# Patient Record
Sex: FEMALE | Race: WHITE | NOT HISPANIC OR LATINO | Employment: FULL TIME | URBAN - METROPOLITAN AREA
[De-identification: names, ages, dates, MRNs, and addresses within clinical notes are randomized per-mention and may not be internally consistent; named-entity substitution may affect disease eponyms.]

---

## 2017-01-19 ENCOUNTER — ALLIED HEALTH/NURSE VISIT (OUTPATIENT)
Dept: FAMILY MEDICINE | Facility: CLINIC | Age: 42
End: 2017-01-19
Payer: OTHER GOVERNMENT

## 2017-01-19 DIAGNOSIS — R07.0 THROAT PAIN: Primary | ICD-10-CM

## 2017-01-19 LAB
DEPRECATED S PYO AG THROAT QL EIA: NORMAL
MICRO REPORT STATUS: NORMAL
SPECIMEN SOURCE: NORMAL

## 2017-01-19 PROCEDURE — 99207 ZZC NO CHARGE NURSE ONLY: CPT

## 2017-01-19 PROCEDURE — 87081 CULTURE SCREEN ONLY: CPT | Performed by: FAMILY MEDICINE

## 2017-01-19 PROCEDURE — 87880 STREP A ASSAY W/OPTIC: CPT | Performed by: FAMILY MEDICINE

## 2017-01-19 NOTE — Clinical Note
[facility]  [address] [facility] [address]  Phone: [phone]    January 23, 2017    Jane Andrew  [address] [address] [address]              Dear Ms. Andrew,      The results of your 24 hour throat culture were negative. Please contact your clinic if you have any questions or concerns.              Sincerely,      Sameera Avila MD/ [facility]

## 2017-01-19 NOTE — NURSING NOTE
Patient here today for strep test only. Daughter was tested positive in clinic.     Rapid strep was negative and patient notified.    Kay Worrell CMA

## 2017-01-21 LAB
BACTERIA SPEC CULT: NORMAL
MICRO REPORT STATUS: NORMAL
SPECIMEN SOURCE: NORMAL

## 2017-05-23 ENCOUNTER — OFFICE VISIT (OUTPATIENT)
Dept: DERMATOLOGY | Facility: CLINIC | Age: 42
End: 2017-05-23
Payer: COMMERCIAL

## 2017-05-23 VITALS — OXYGEN SATURATION: 99 % | DIASTOLIC BLOOD PRESSURE: 70 MMHG | HEART RATE: 79 BPM | SYSTOLIC BLOOD PRESSURE: 128 MMHG

## 2017-05-23 DIAGNOSIS — L81.4 LENTIGO: ICD-10-CM

## 2017-05-23 DIAGNOSIS — D23.9 DERMAL NEVUS: ICD-10-CM

## 2017-05-23 DIAGNOSIS — D48.5 NEOPLASM OF UNCERTAIN BEHAVIOR OF SKIN: Primary | ICD-10-CM

## 2017-05-23 DIAGNOSIS — D18.00 ANGIOMA: ICD-10-CM

## 2017-05-23 DIAGNOSIS — D22.9 NEVUS: ICD-10-CM

## 2017-05-23 PROCEDURE — 88305 TISSUE EXAM BY PATHOLOGIST: CPT | Performed by: DERMATOLOGY

## 2017-05-23 PROCEDURE — 99203 OFFICE O/P NEW LOW 30 MIN: CPT | Mod: 25 | Performed by: DERMATOLOGY

## 2017-05-23 PROCEDURE — 11101 HC BIOPSY SKIN/SUBQ/MUC MEM, EACH ADDTL LESION: CPT | Performed by: DERMATOLOGY

## 2017-05-23 PROCEDURE — 11100 HC BIOPSY SKIN/SUBQ/MUC MEM, SINGLE LESION: CPT | Performed by: DERMATOLOGY

## 2017-05-23 NOTE — PROGRESS NOTES
Jane Andrew is a 41 year old year old female patient here today for itching spots on neck and growing spot on forehead.   .  Patient states this has been present for a while.  Patient reports the following symptoms:  itching.  Patient reports the following previous treatments none.  Patient reports the following modifying factors none.  Associated symptoms: none.  Patient has no other skin complaints today.  Remainder of the HPI, Meds, PMH, Allergies, FH, and SH was reviewed in chart.    Pertinent Hx:   No hx of skin cancer  Past Medical History:   Diagnosis Date     Chickenpox      NO ACTIVE PROBLEMS        Past Surgical History:   Procedure Laterality Date     HC TOOTH EXTRACTION W/FORCEP      wisdom teeth         Family History   Problem Relation Age of Onset     Hypertension Mother      HEART DISEASE Father      CHF     Hypertension Father      CANCER Maternal Grandfather      HEART DISEASE Maternal Grandfather      Genitourinary Problems Paternal Grandfather      Genetic Disorder Maternal Aunt      fathers twin had spina bifida       Social History     Social History     Marital status:      Spouse name: N/A     Number of children: N/A     Years of education: N/A     Occupational History     Not on file.     Social History Main Topics     Smoking status: Never Smoker     Smokeless tobacco: Never Used     Alcohol use No     Drug use: No     Sexual activity: Yes     Partners: Male     Other Topics Concern     Parent/Sibling W/ Cabg, Mi Or Angioplasty Before 65f 55m? No     Social History Narrative       Outpatient Encounter Prescriptions as of 5/23/2017   Medication Sig Dispense Refill     medroxyPROGESTERone (DEPO-PROVERA) 150 MG/ML injection Inject 1 mL into the muscle every 3 months. 1 mL 0     No facility-administered encounter medications on file as of 5/23/2017.              Review Of Systems  Skin: As above  Eyes: negative  Ears/Nose/Throat: negative  Respiratory: No shortness of breath, dyspnea  on exertion, cough, or hemoptysis  Cardiovascular: negative  Gastrointestinal: negative  Genitourinary: negative  Musculoskeletal: negative  Neurologic: negative  Psychiatric: negative  Hematologic/Lymphatic/Immunologic: negative  Endocrine: negative      O:   NAD, WDWN, Alert & Oriented, Mood & Affect wnl, Vitals stable   Here today alone   /70  Pulse 79  SpO2 99%   General appearance normal   Vitals stable   Alert, oriented and in no acute distress      Following lymph nodes palpated: Occipital, Cervical, Supraclavicular no lad   Brown macules on trunk   2-3mm pigmented macules with regular borders and pigment networks on buttocks and legs and feet   Red papule son trunk   L post neck superior 7mm papillamtous papule    L post neck inferior 15mm papillamtous papule   R forehead 1.1cm flesh colore dpapule      The remainder of the full exam was unremarkable; the following areas were examined:  conjunctiva/lids, oral mucosa, neck, peripheral vascular system, abdomen, lymph nodes, digits/nails, eccrine and apocrine glands, scalp/hair, face, neck, chest, abdomen, buttocks, back, RUE, LUE, RLE, LLE       Eyes: Conjunctivae/lids:Normal     ENT: Lips, buccal mucosa, tongue: normal    MSK:Normal    Cardiovascular: peripheral edema none    Pulm: Breathing Normal    Lymph Nodes: No Head and Neck Lymphadenopathy     Neuro/Psych: Orientation:Normal; Mood/Affect:Normal      A/P:  1., angioma, lentigo, nevi   2. L post neck sup and inf r/o irritated idn  TANGENTIAL BIOPSY SENT OUT:  After consent, anesthesia with LEC and prep, tangential excision performed and specimen sent out for permanent section histology.  No complications and routine wound care. Patient told to call our office in 1-2 weeks for result.      3. Growing nevus  Schedule excision   BENIGN LESIONS DISCUSSED WITH PATIENT:  I discussed the specifics of tumor, prognosis, and genetics of benign lesions.  I explained that treatment of these lesions would be  purely cosmetic and not medically neccessary.  I discussed with patient different removal options including excision, cautery and /or laser.      Nature and genetics of benign skin lesions dicussed with patient.  Signs and Symptoms of skin cancer discussed with patient.  ABCDEs of melanoma reviewed with patient.  Patient encouraged to perform monthly skin exams.  UV precautions reviewed with patient.  Skin care regimen reviewed with patient: Eliminate harsh soaps, i.e. Dial, zest, irsih spring; Mild soaps such as Cetaphil or Dove sensitive skin, avoid hot or cold showers, aggressive use of emollients including vanicream, cetaphil or cerave discussed with patient.    Risks of non-melanoma skin cancer discussed with patient   Return to clinic 12 months

## 2017-05-23 NOTE — MR AVS SNAPSHOT
After Visit Summary   5/23/2017    Jane Andrew    MRN: [id number]           Patient Information     Date Of Birth          1975        Visit Information        Provider Department      5/23/2017 12:00 PM Bruce Gillespie MD [facility]        Today's Diagnoses     Neoplasm of uncertain behavior of skin    -  1    Dermal nevus        Nevus        Lentigo        Angioma          Care Instructions          Wound Care Instructions     FOR SUPERFICIAL WOUNDS     [facility] [phone]    [facility] [phone]                       AFTER 24 HOURS YOU SHOULD REMOVE THE BANDAGE AND BEGIN DAILY DRESSING CHANGES AS FOLLOWS:     1) Remove Dressing.     2) Clean and dry the area with tap water using a Q-tip or sterile gauze pad.     3) Apply Vaseline, Aquaphor, Polysporin ointment or Bacitracin ointment over entire wound.  Do NOT use Neosporin ointment.     4) Cover the wound with a band-aid, or a sterile non-stick gauze pad and micropore paper tape      REPEAT THESE INSTRUCTIONS AT LEAST ONCE A DAY UNTIL THE WOUND HAS COMPLETELY HEALED.    It is an old wives tale that a wound heals better when it is exposed to air and allowed to dry out. The wound will heal faster with a better cosmetic result if it is kept moist with ointment and covered with a bandage.    **Do not let the wound dry out.**      Supplies Needed:      *Cotton tipped applicators (Q-tips)    *Polysporin Ointment or Bacitracin Ointment (NOT NEOSPORIN)    *Band-aids or non-stick gauze pads and micropore paper tape.      PATIENT INFORMATION:    During the healing process you will notice a number of changes. All wounds develop a small halo of redness surrounding the wound.  This means healing is occurring. Severe itching with extensive redness usually indicates sensitivity to the ointment or bandage tape used to dress the wound.  You should call our office if this develops.      Swelling  and/or  discoloration around your surgical site is common, particularly when performed around the eye.    All wounds normally drain.  The larger the wound the more drainage there will be.  After 7-10 days, you will notice the wound beginning to shrink and new skin will begin to grow.  The wound is healed when you can see skin has formed over the entire area.  A healed wound has a healthy, shiny look to the surface and is red to dark pink in color to normalize.  Wounds may take approximately 4-6 weeks to heal.  Larger wounds may take 6-8 weeks.  After the wound is healed you may discontinue dressing changes.    You may experience a sensation of tightness as your wound heals. This is normal and will gradually subside.    Your healed wound may be sensitive to temperature changes. This sensitivity improves with time, but if you re having a lot of discomfort, try to avoid temperature extremes.    Patients frequently experience itching after their wound appears to have healed because of the continue healing under the skin.  Plain Vaseline will help relieve the itching.        POSSIBLE COMPLICATIONS    BLEEDIN. Leave the bandage in place.  2. Use tightly rolled up gauze or a cloth to apply direct pressure over the bandage for 30  minutes.  3. Reapply pressure for an additional 30 minutes if necessary  4. Use additional gauze and tape to maintain pressure once the bleeding has stopped.          Follow-ups after your visit        Who to contact     If you have questions or need follow up information about today's clinic visit or your schedule please contact [facility] directly at [phone].  Normal or non-critical lab and imaging results will be communicated to you by MyChart, letter or phone within 4 business days after the clinic has received the results. If you do not hear from us within 7 days, please contact the clinic through MyChart or phone. If you have a critical or abnormal lab result, we will  notify you by phone as soon as possible.  Submit refill requests through [employer] or call your pharmacy and they will forward the refill request to us. Please allow 3 business days for your refill to be completed.          Additional Information About Your Visit        Covercake[employer]Comeet Information     [employer] gives you secure access to your electronic health record. If you see a primary care provider, you can also send messages to your care team and make appointments. If you have questions, please call your primary care clinic.  If you do not have a primary care provider, please call [phone] and they will assist you.        Care EveryWhere ID     This is your Care EveryWhere ID. This could be used by other organizations to access your [place] medical records  [id number]        Your Vitals Were     Pulse Pulse Oximetry                79 99%           Blood Pressure from Last 3 Encounters:   05/23/17 128/70   09/10/15 125/71   08/15/13 110/70    Weight from Last 3 Encounters:   09/10/15 72.6 kg (160 lb)   08/15/13 75.3 kg (166 lb)   02/20/13 75.6 kg (166 lb 9.6 oz)              We Performed the Following     BIOPSY SKIN/SUBQ/MUC MEM, EACH ADDTL LESION     BIOPSY SKIN/SUBQ/MUC MEM, SINGLE LESION     Surgical pathology exam        Primary Care Provider Office Phone # Fax #    Bernice Anglin [phone]       [facility] [address] [facility] [address]        Thank you!     Thank you for choosing [facility]  for your care. Our goal is always to provide you with excellent care. Hearing back from our patients is one way we can continue to improve our services. Please take a few minutes to complete the written survey that you may receive in the mail after your visit with us. Thank you!             Your Updated Medication List - Protect others around you: Learn how to safely use, store and throw away your medicines at www.disposemymeds.org.          This list is accurate as of:  5/23/17 12:13 PM.  Always use your most recent med list.                   Brand Name Dispense Instructions for use    DEPO-PROVERA 150 MG/ML injection   Generic drug:  medroxyPROGESTERone     1 mL    Inject 1 mL into the muscle every 3 months.

## 2017-05-23 NOTE — NURSING NOTE
"Initial /70  Pulse 79  SpO2 99% Estimated body mass index is 28.34 kg/(m^2) as calculated from the following:    Height as of 9/10/15: 1.6 m (5' 3\").    Weight as of 9/10/15: 72.6 kg (160 lb). .      "

## 2017-05-23 NOTE — PATIENT INSTRUCTIONS
Wound Care Instructions     FOR SUPERFICIAL WOUNDS     [facility] [phone]    [facility] [phone]                       AFTER 24 HOURS YOU SHOULD REMOVE THE BANDAGE AND BEGIN DAILY DRESSING CHANGES AS FOLLOWS:     1) Remove Dressing.     2) Clean and dry the area with tap water using a Q-tip or sterile gauze pad.     3) Apply Vaseline, Aquaphor, Polysporin ointment or Bacitracin ointment over entire wound.  Do NOT use Neosporin ointment.     4) Cover the wound with a band-aid, or a sterile non-stick gauze pad and micropore paper tape      REPEAT THESE INSTRUCTIONS AT LEAST ONCE A DAY UNTIL THE WOUND HAS COMPLETELY HEALED.    It is an old wives tale that a wound heals better when it is exposed to air and allowed to dry out. The wound will heal faster with a better cosmetic result if it is kept moist with ointment and covered with a bandage.    **Do not let the wound dry out.**      Supplies Needed:      *Cotton tipped applicators (Q-tips)    *Polysporin Ointment or Bacitracin Ointment (NOT NEOSPORIN)    *Band-aids or non-stick gauze pads and micropore paper tape.      PATIENT INFORMATION:    During the healing process you will notice a number of changes. All wounds develop a small halo of redness surrounding the wound.  This means healing is occurring. Severe itching with extensive redness usually indicates sensitivity to the ointment or bandage tape used to dress the wound.  You should call our office if this develops.      Swelling  and/or discoloration around your surgical site is common, particularly when performed around the eye.    All wounds normally drain.  The larger the wound the more drainage there will be.  After 7-10 days, you will notice the wound beginning to shrink and new skin will begin to grow.  The wound is healed when you can see skin has formed over the entire area.  A healed wound has a healthy, shiny look to the surface and is red to dark pink in color  to normalize.  Wounds may take approximately 4-6 weeks to heal.  Larger wounds may take 6-8 weeks.  After the wound is healed you may discontinue dressing changes.    You may experience a sensation of tightness as your wound heals. This is normal and will gradually subside.    Your healed wound may be sensitive to temperature changes. This sensitivity improves with time, but if you re having a lot of discomfort, try to avoid temperature extremes.    Patients frequently experience itching after their wound appears to have healed because of the continue healing under the skin.  Plain Vaseline will help relieve the itching.        POSSIBLE COMPLICATIONS    BLEEDIN. Leave the bandage in place.  2. Use tightly rolled up gauze or a cloth to apply direct pressure over the bandage for 30  minutes.  3. Reapply pressure for an additional 30 minutes if necessary  4. Use additional gauze and tape to maintain pressure once the bleeding has stopped.

## 2017-05-30 LAB — COPATH REPORT: NORMAL

## 2017-06-06 ENCOUNTER — OFFICE VISIT (OUTPATIENT)
Dept: DERMATOLOGY | Facility: CLINIC | Age: 42
End: 2017-06-06
Payer: COMMERCIAL

## 2017-06-06 VITALS — DIASTOLIC BLOOD PRESSURE: 72 MMHG | OXYGEN SATURATION: 95 % | HEART RATE: 65 BPM | SYSTOLIC BLOOD PRESSURE: 124 MMHG

## 2017-06-06 DIAGNOSIS — D48.5 NEOPLASM OF UNCERTAIN BEHAVIOR OF SKIN: Primary | ICD-10-CM

## 2017-06-06 PROCEDURE — 88305 TISSUE EXAM BY PATHOLOGIST: CPT | Performed by: DERMATOLOGY

## 2017-06-06 PROCEDURE — 13132 CMPLX RPR F/C/C/M/N/AX/G/H/F: CPT | Performed by: DERMATOLOGY

## 2017-06-06 PROCEDURE — 11442 EXC FACE-MM B9+MARG 1.1-2 CM: CPT | Performed by: DERMATOLOGY

## 2017-06-06 NOTE — PROGRESS NOTES
Jane Andrew is a 41 year old year old female patient here today for evaluation and managment of growing itching mole on right forehead.  Patient has no other skin complaints today.  Remainder of the HPI, Meds, PMH, Allergies, FH, and SH was reviewed in chart.      Past Medical History:   Diagnosis Date     Chickenpox      NO ACTIVE PROBLEMS        Past Surgical History:   Procedure Laterality Date     HC TOOTH EXTRACTION W/FORCEP      wisdom teeth         Family History   Problem Relation Age of Onset     Hypertension Mother      HEART DISEASE Father      CHF     Hypertension Father      CANCER Maternal Grandfather      HEART DISEASE Maternal Grandfather      Genitourinary Problems Paternal Grandfather      Genetic Disorder Maternal Aunt      fathers twin had spina bifida       Social History     Social History     Marital status:      Spouse name: N/A     Number of children: N/A     Years of education: N/A     Occupational History     Not on file.     Social History Main Topics     Smoking status: Never Smoker     Smokeless tobacco: Never Used     Alcohol use No     Drug use: No     Sexual activity: Yes     Partners: Male     Other Topics Concern     Parent/Sibling W/ Cabg, Mi Or Angioplasty Before 65f 55m? No     Social History Narrative       Outpatient Encounter Prescriptions as of 6/6/2017   Medication Sig Dispense Refill     Multiple Vitamins-Minerals (MULTIVITAMIN ADULT PO)        [DISCONTINUED] medroxyPROGESTERone (DEPO-PROVERA) 150 MG/ML injection Inject 1 mL into the muscle every 3 months. 1 mL 0     No facility-administered encounter medications on file as of 6/6/2017.              Review Of Systems  Skin: As above  Eyes: negative  Ears/Nose/Throat: negative  Respiratory: No shortness of breath, dyspnea on exertion, cough, or hemoptysis  Cardiovascular: negative  Gastrointestinal: negative  Genitourinary: negative  Musculoskeletal: negative  Neurologic: negative  Psychiatric:  negative  Hematologic/Lymphatic/Immunologic: negative  Endocrine: negative      O:   NAD, WDWN, Alert & Oriented, Mood & Affect wnl, Vitals stable   Here today alone   /72  Pulse 65  SpO2 95%   General appearance normal   Vitals stable   Alert, oriented and in no acute distress     R forehead flesh colored 1.2cm plaque       Eyes: Conjunctivae/lids:Normal     ENT: Lips, buccal mucosa, tongue: normal    MSK:Normal    Cardiovascular: peripheral edema none    Pulm: Breathing Normal    Neuro/Psych: Orientation:Normal; Mood/Affect:Normal      A/P:  1. Growing dermal nevus  EXCISION OF BENIGN LESION AND COMPLEX: [facility] obtained, anesthesia and prep, the margins of the lesion were identified and an elliptical incision was made encompassing the lesion. The incisions were made through the skin and down to and including the subcutaneous tissue. The lesion was removed en bloc and submitted for perms pathologic review. The wound edges were widely undermined until adequate tissue mobility was obtained. hemostasis was achieved. The wound edges were then closed in a layered fashion, being careful not to leave any dead space. Postoperative length was 3.2 cm.   EBL minimal; complications none; wound care routine. The patient was discharged in good condition and will return in one week for wound evaluation.

## 2017-06-06 NOTE — MR AVS SNAPSHOT
After Visit Summary   2017    Jane Andrew    MRN: [id number]           Patient Information     Date Of Birth          1975        Visit Information        Provider Department      2017 8:15 AM Bruce Gillespie MD [facility]        Care Instructions      Sutured Wound Care     [facility]: [phone]    [facility]: [phone]    Right forehead      ? No strenuous activity for 48 hours. Resume moderate activity in 48 hours. No heavy exercising until you are seen for follow up in one week.     ? Take Tylenol as needed for discomfort.                         ? Do not drink alcoholic beverages for 48 hours.     ? Keep the pressure bandage in place for 24 hours. If the bandage becomes blood tinged or loose, reinforce it with gauze and tape.        (Refer to the reverse side of this page for management of bleeding).    ? Remove pressure bandage in 24 hours     ? Leave the flat bandage in place until your follow up appointment.    ? Keep the bandage dry. Wash around it carefully.    ? If the tape becomes soiled or starts to come off, reinforce it with additional paper tape.    ? Do not smoke for 3 weeks; smoking is detrimental to wound healing.    ? It is normal to have swelling and bruising around the surgical site. The bruising will fade in approximately 10-14 days. Elevate the area to reduce swelling.    ? Numbness, itchiness and sensitivity to temperature changes can occur after surgery and may take up to 18 months to normalize.      POSSIBLE COMPLICATIONS    BLEEDIN. Leave the bandage in place.  2. Use tightly rolled up gauze or a cloth to apply direct pressure over the bandage for 20   minutes.  3. Reapply pressure for an additional 20 minutes if necessary  4. Call the office or go to the nearest emergency room if pressure fails to stop the bleeding.  5. Use additional gauze and tape to maintain pressure once the bleeding has  stopped.        PAIN:    1. Post operative pain should slowly get better, never worse.  2. A severe increase in pain may indicate a problem. Call the office if this occurs.    In case of emergency phone:Dr Gillespie [phone]            Follow-ups after your visit        Who to contact     If you have questions or need follow up information about today's clinic visit or your schedule please contact [facility] directly at [phone].  Normal or non-critical lab and imaging results will be communicated to you by CallApphart, letter or phone within 4 business days after the clinic has received the results. If you do not hear from us within 7 days, please contact the clinic through [employer] or phone. If you have a critical or abnormal lab result, we will notify you by phone as soon as possible.  Submit refill requests through [employer] or call your pharmacy and they will forward the refill request to us. Please allow 3 business days for your refill to be completed.          Additional Information About Your Visit        Store[employer] Information     [employer] gives you secure access to your electronic health record. If you see a primary care provider, you can also send messages to your care team and make appointments. If you have questions, please call your primary care clinic.  If you do not have a primary care provider, please call [phone] and they will assist you.        Care EveryWhere ID     This is your Care EveryWhere ID. This could be used by other organizations to access your [place] medical records  [id number]        Your Vitals Were     Pulse Pulse Oximetry                65 95%           Blood Pressure from Last 3 Encounters:   06/06/17 124/72   05/23/17 128/70   09/10/15 125/71    Weight from Last 3 Encounters:   09/10/15 72.6 kg (160 lb)   08/15/13 75.3 kg (166 lb)   02/20/13 75.6 kg (166 lb 9.6 oz)              Today, you had the following     No orders found for display         Today's  Medication Changes          These changes are accurate as of: 6/6/17  8:51 AM.  If you have any questions, ask your nurse or doctor.               Stop taking these medicines if you haven't already. Please contact your care team if you have questions.     DEPO-PROVERA 150 MG/ML injection   Generic drug:  medroxyPROGESTERone   Stopped by:  Bruce Gillespie MD                    Primary Care Provider Office Phone # Fax #    Bernice Anglin [phone]       [facility] [address] [facility] [address]        Thank you!     Thank you for choosing [facility]  for your care. Our goal is always to provide you with excellent care. Hearing back from our patients is one way we can continue to improve our services. Please take a few minutes to complete the written survey that you may receive in the mail after your visit with us. Thank you!             Your Updated Medication List - Protect others around you: Learn how to safely use, store and throw away your medicines at www.disposemymeds.org.          This list is accurate as of: 6/6/17  8:51 AM.  Always use your most recent med list.                   Brand Name Dispense Instructions for use    MULTIVITAMIN ADULT PO

## 2017-06-06 NOTE — NURSING NOTE
"Initial /72  Pulse 65  SpO2 95% Estimated body mass index is 28.34 kg/(m^2) as calculated from the following:    Height as of 9/10/15: 1.6 m (5' 3\").    Weight as of 9/10/15: 72.6 kg (160 lb). .    Kasandra Perez LPN    "

## 2017-06-06 NOTE — PATIENT INSTRUCTIONS
Sutured Wound Care     [facility]: [phone]    [facility]: [phone]    Right forehead      ? No strenuous activity for 48 hours. Resume moderate activity in 48 hours. No heavy exercising until you are seen for follow up in one week.     ? Take Tylenol as needed for discomfort.                         ? Do not drink alcoholic beverages for 48 hours.     ? Keep the pressure bandage in place for 24 hours. If the bandage becomes blood tinged or loose, reinforce it with gauze and tape.        (Refer to the reverse side of this page for management of bleeding).    ? Remove pressure bandage in 24 hours     ? Leave the flat bandage in place until your follow up appointment.    ? Keep the bandage dry. Wash around it carefully.    ? If the tape becomes soiled or starts to come off, reinforce it with additional paper tape.    ? Do not smoke for 3 weeks; smoking is detrimental to wound healing.    ? It is normal to have swelling and bruising around the surgical site. The bruising will fade in approximately 10-14 days. Elevate the area to reduce swelling.    ? Numbness, itchiness and sensitivity to temperature changes can occur after surgery and may take up to 18 months to normalize.      POSSIBLE COMPLICATIONS    BLEEDIN. Leave the bandage in place.  2. Use tightly rolled up gauze or a cloth to apply direct pressure over the bandage for 20   minutes.  3. Reapply pressure for an additional 20 minutes if necessary  4. Call the office or go to the nearest emergency room if pressure fails to stop the bleeding.  5. Use additional gauze and tape to maintain pressure once the bleeding has stopped.        PAIN:    1. Post operative pain should slowly get better, never worse.  2. A severe increase in pain may indicate a problem. Call the office if this occurs.    In case of emergency phone:Dr Gillespie [phone]

## 2017-06-09 LAB — COPATH REPORT: NORMAL

## 2017-06-14 ENCOUNTER — ALLIED HEALTH/NURSE VISIT (OUTPATIENT)
Dept: DERMATOLOGY | Facility: CLINIC | Age: 42
End: 2017-06-14
Payer: COMMERCIAL

## 2017-06-14 DIAGNOSIS — Z48.01 ENCOUNTER FOR CHANGE OR REMOVAL OF SURGICAL WOUND DRESSING: Primary | ICD-10-CM

## 2017-06-14 PROCEDURE — 99207 ZZC NO CHARGE NURSE ONLY: CPT

## 2017-06-14 NOTE — PATIENT INSTRUCTIONS
WOUND CARE INSTRUCTIONS  for  ONE WEEK AFTER SURGERY               Right Forehead       1) Leave flat bandage on your skin for one week after today s bandage change.  2) In one week when you remove the bandage, you may resume your regular skin care routine, including washing with mild soap and water, applying moisturizer, make-up and sunscreen.    3) If there are any open or bleeding areas at the incision/graft site you should begin to cover the area with a bandage daily as follows:    1) Clean and dry the area with plain tap water using a Q-tip or sterile gauze pad.  2) Apply Polysporin or Bacitracin ointment to the open area.  3) Cover the wound with a band-aid or a sterile non-stick gauze pad and micropore paper tape.             *Once the bandages are removed, the scar will be red and firm (especially in the lip/chin area). This is normal and will fade in time. It might take 6-12 months for this to happen.     *Massaging the area will help the scar soften and fade quicker. Begin to massage the area one month after the bandages have been removed. To massage apply pressure directly and firmly over the scar with the fingertips and move in a circular motion. Massage the area for a few minutes several times a day. Continue to massage the site for several months.    *Approximately 6-8 weeks after surgery it is not uncommon to see the formation of  tender pimple-like  bump along the scar. This is normal. As the scar continues to mature and the stitches underneath the skin begin to dissolve, this might occur. Do not pick or squeeze, this will resolve on it s own. Should one break open producing a small amount of drainage, apply Polysporin or Bacitracin ointment a few times a day until the wound is completely healed.    *Numbness in the surgical area is expected. It might take 12-18 months for the feeling to return to normal. During this time sensations of itchiness, tingling and occasional sharp pains might be noted.  These feelings are normal and will subside once the nerves have completely healed.         IN CASE OF EMERGENCY: Dr Gillespie [phone]     If you were seen in [place] call: [phone]    If you were seen in [place] call: [phone]

## 2017-06-14 NOTE — MR AVS SNAPSHOT
After Visit Summary   6/14/2017    Jane Andrew    MRN: [id number]           Patient Information     Date Of Birth          1975        Visit Information        Provider Department      6[facility]14/2017 1[facility]00 [facility]        Care Instructions    WOUND CARE INSTRUCTIONS  for  ONE WEEK AFTER SURGERY               Right Forehead       1) Leave flat bandage on your skin for one week after today s bandage change.  2) In one week when you remove the bandage, you may resume your regular skin care routine, including washing with mild soap and water, applying moisturizer, make-up and sunscreen.    3) If there are any open or bleeding areas at the incision/graft site you should begin to cover the area with a bandage daily as follows:    1) Clean and dry the area with plain tap water using a Q-tip or sterile gauze pad.  2) Apply Polysporin or Bacitracin ointment to the open area.  3) Cover the wound with a band-aid or a sterile non-stick gauze pad and micropore paper tape.             *Once the bandages are removed, the scar will be red and firm (especially in the lip/chin area). This is normal and will fade in time. It might take 6-12 months for this to happen.     *Massaging the area will help the scar soften and fade quicker. Begin to massage the area one month after the bandages have been removed. To massage apply pressure directly and firmly over the scar with the fingertips and move in a circular motion. Massage the area for a few minutes several times a day. Continue to massage the site for several months.    *Approximately 6-8 weeks after surgery it is not uncommon to see the formation of  tender pimple-like  bump along the scar. This is normal. As the scar continues to mature and the stitches underneath the skin begin to dissolve, this might occur. Do not pick or squeeze, this will resolve on it s own. Should one break open producing a small amount of drainage, apply  Polysporin or Bacitracin ointment a few times a day until the wound is completely healed.    *Numbness in the surgical area is expected. It might take 12-18 months for the feeling to return to normal. During this time sensations of itchiness, tingling and occasional sharp pains might be noted. These feelings are normal and will subside once the nerves have completely healed.         IN CASE OF EMERGENCY: Dr Gillespie [phone]     If you were seen in [place] call: [phone]    If you were seen in [place] call: [phone]              Follow-ups after your visit        Who to contact     If you have questions or need follow up information about today's clinic visit or your schedule please contact [facility] directly at [phone].  Normal or non-critical lab and imaging results will be communicated to you by MyChart, letter or phone within 4 business days after the clinic has received the results. If you do not hear from us within 7 days, please contact the clinic through [employer] or phone. If you have a critical or abnormal lab result, we will notify you by phone as soon as possible.  Submit refill requests through [employer] or call your pharmacy and they will forward the refill request to us. Please allow 3 business days for your refill to be completed.          Additional Information About Your Visit        [employer] Information     [employer] gives you secure access to your electronic health record. If you see a primary care provider, you can also send messages to your care team and make appointments. If you have questions, please call your primary care clinic.  If you do not have a primary care provider, please call [phone] and they will assist you.        Care EveryWhere ID     This is your Care EveryWhere ID. This could be used by other organizations to access your [place] medical records  [id number]         Blood Pressure from Last 3 Encounters:   06/06/17 124/72   05/23/17 128/70    09/10/15 125/71    Weight from Last 3 Encounters:   09/10/15 72.6 kg (160 lb)   08/15/13 75.3 kg (166 lb)   02/20/13 75.6 kg (166 lb 9.6 oz)              Today, you had the following     No orders found for display       Primary Care Provider Office Phone # Fax #    Bernice Anglin [phone]       [facility] [address] [facility] [address]        Thank you!     Thank you for choosing [facility]  for your care. Our goal is always to provide you with excellent care. Hearing back from our patients is one way we can continue to improve our services. Please take a few minutes to complete the written survey that you may receive in the mail after your visit with us. Thank you!             Your Updated Medication List - Protect others around you: Learn how to safely use, store and throw away your medicines at www.disposemymeds.org.          This list is accurate as of: 6/14/17  1:02 PM.  Always use your most recent med list.                   Brand Name Dispense Instructions for use    MULTIVITAMIN ADULT PO

## 2017-06-15 NOTE — PROGRESS NOTES
Pt returned to clinic for post surgery 1 week follow up bandage change. Pt has no complaints, denies pain. Bandage removed from the right forehead, area cleansed with normal saline. Site is healing and wound edges approximating well. Reapplied new steri strips and paper tape.    Advised to watch for signs/sx of infection; spreading redness, drainage, odor, fever. Call or report promptly to clinic. Pt given written instructions and informed to rtc as needed. Patient verbalized understanding.     Kalani Rosales, CMA

## 2017-09-10 ENCOUNTER — HEALTH MAINTENANCE LETTER (OUTPATIENT)
Age: 42
End: 2017-09-10

## 2018-09-12 ENCOUNTER — ALLIED HEALTH/NURSE VISIT (OUTPATIENT)
Dept: FAMILY MEDICINE | Facility: CLINIC | Age: 43
End: 2018-09-12

## 2018-09-12 DIAGNOSIS — Z11.1 SCREENING EXAMINATION FOR PULMONARY TUBERCULOSIS: Primary | ICD-10-CM

## 2018-09-12 PROCEDURE — 99207 ZZC NO CHARGE LOS: CPT

## 2018-09-12 PROCEDURE — 86580 TB INTRADERMAL TEST: CPT

## 2018-09-12 NOTE — MR AVS SNAPSHOT
After Visit Summary   9/12/2018    Jane Andrew    MRN: [id number]           Patient Information     Date Of Birth          1975        Visit Information        Provider Department      9/12/2018 8:45 AM Marquez/Lizandro Dunham [facility]        Today's Diagnoses     Screening examination for pulmonary tuberculosis    -  1       Follow-ups after your visit        Your next 10 appointments already scheduled     Sep 14, 2018  9:00 AM CDT   SHORT with LIZANDRO RICO RN   [facility]    [address]  [facility] [address]   [phone]              Who to contact     If you have questions or need follow up information about today's clinic visit or your schedule please contact [facility] directly at [phone].  Normal or non-critical lab and imaging results will be communicated to you by MyChart, letter or phone within 4 business days after the clinic has received the results. If you do not hear from us within 7 days, please contact the clinic through MyChart or phone. If you have a critical or abnormal lab result, we will notify you by phone as soon as possible.  Submit refill requests through [employer] or call your pharmacy and they will forward the refill request to us. Please allow 3 business days for your refill to be completed.          Additional Information About Your Visit        MyChart Information     [facility] gives you secure access to your electronic health record. If you see a primary care provider, you can also send messages to your care team and make appointments. If you have questions, please call your primary care clinic.  If you do not have a primary care provider, please call [phone] and they will assist you.        Care EveryWhere ID     This is your Care EveryWhere ID. This could be used by other organizations to access your [place] medical records  [id number]         Blood Pressure from  Last 3 Encounters:   06/06/17 124/72   05/23/17 128/70   09/10/15 125/71    Weight from Last 3 Encounters:   09/10/15 160 lb (72.6 kg)   08/15/13 166 lb (75.3 kg)   02/20/13 166 lb 9.6 oz (75.6 kg)              We Performed the Following     TB INTRADERMAL TEST        Primary Care Provider Office Phone # Fax #    Bernice Anglin [phone] [address] [facility] [address]        Equal Access to Services     [facility]: Hadii aad ku hadasho Soomaali, waaxda luqadaha, qaybta kaalmada adeegyada, rizwana nevarez . So [facility] [phone].    ATENCIÓN: Si habla español, tiene a newsome disposición servicios gratuitos de asistencia lingüística. Llame al [phone].    We comply with applicable federal civil rights laws and [place] laws. We do not discriminate on the basis of race, color, national origin, age, disability, sex, sexual orientation, or gender identity.            Thank you!     Thank you for choosing [facility]  for your care. Our goal is always to provide you with excellent care. Hearing back from our patients is one way we can continue to improve our services. Please take a few minutes to complete the written survey that you may receive in the mail after your visit with us. Thank you!             Your Updated Medication List - Protect others around you: Learn how to safely use, store and throw away your medicines at www.disposemymeds.org.          This list is accurate as of 9/12/18  9:22 AM.  Always use your most recent med list.                   Brand Name Dispense Instructions for use Diagnosis    MULTIVITAMIN ADULT PO       Neoplasm of uncertain behavior of skin

## 2018-09-14 ENCOUNTER — OFFICE VISIT (OUTPATIENT)
Dept: FAMILY MEDICINE | Facility: CLINIC | Age: 43
End: 2018-09-14

## 2018-09-14 DIAGNOSIS — Z11.1 SCREENING EXAMINATION FOR PULMONARY TUBERCULOSIS: Primary | ICD-10-CM

## 2018-09-14 LAB
PPDINDURATION: 0 MM (ref 0–5)
PPDREDNESS: 0 MM

## 2018-09-14 PROCEDURE — 99207 ZZC NO CHARGE NURSE ONLY: CPT

## 2018-09-14 NOTE — LETTER
[facility]  [address] [address]  Phone: [phone]    September 14, 2018        Jane Andrew  [address] [address] [address]          To whom it may concern:    RE: Jane Andrew    Mantoux result = negative  Lab Results   Component Value Date    PPDREDNESS 0 09/14/2018    PPDINDURATIO 0 09/14/2018       Sincerely,            CL RN

## 2018-09-14 NOTE — MR AVS SNAPSHOT
After Visit Summary   9/14/2018    Jane Andrew    MRN: [id number]           Patient Information     Date Of Birth          1975        Visit Information        Provider Department      9/14/2018 9:00 AM FL CL RN [facility]        Today's Diagnoses     Screening examination for pulmonary tuberculosis    -  1       Follow-ups after your visit        Who to contact     If you have questions or need follow up information about today's clinic visit or your schedule please contact [facility] directly at [phone].  Normal or non-critical lab and imaging results will be communicated to you by [employer], letter or phone within 4 business days after the clinic has received the results. If you do not hear from us within 7 days, please contact the clinic through [employer] or phone. If you have a critical or abnormal lab result, we will notify you by phone as soon as possible.  Submit refill requests through [employer] or call your pharmacy and they will forward the refill request to us. Please allow 3 business days for your refill to be completed.          Additional Information About Your Visit        MyChart Information     [employer] gives you secure access to your electronic health record. If you see a primary care provider, you can also send messages to your care team and make appointments. If you have questions, please call your primary care clinic.  If you do not have a primary care provider, please call [phone] and they will assist you.        Care EveryWhere ID     This is your Care EveryWhere ID. This could be used by other organizations to access your [place] medical records  [id number]         Blood Pressure from Last 3 Encounters:   06/06/17 124/72   05/23/17 128/70   09/10/15 125/71    Weight from Last 3 Encounters:   09/10/15 160 lb (72.6 kg)   08/15/13 166 lb (75.3 kg)   02/20/13 166 lb 9.6 oz (75.6 kg)              Today, you had the following     No  orders found for display       Primary Care Provider Office Phone # Fax #    Bernice Anglin [phone] [address] [facility] [address]        Equal Access to Services     [facility] : Hadii aad ku hadrennyo Soomaali, waaxda luqadaha, qaybta kaalmada adeegyada, rizwana patton laAngel Luissarah armendariz. So [facility] [phone].    ATENCIÓN: Si habla español, tiene a newsome disposición servicios gratuitos de asistencia lingüística. Llame al [phone].    We comply with applicable federal civil rights laws and [place] laws. We do not discriminate on the basis of race, color, national origin, age, disability, sex, sexual orientation, or gender identity.            Thank you!     Thank you for choosing [facility]  for your care. Our goal is always to provide you with excellent care. Hearing back from our patients is one way we can continue to improve our services. Please take a few minutes to complete the written survey that you may receive in the mail after your visit with us. Thank you!             Your Updated Medication List - Protect others around you: Learn how to safely use, store and throw away your medicines at www.disposemymeds.org.          This list is accurate as of 9/14/18  9:13 AM.  Always use your most recent med list.                   Brand Name Dispense Instructions for use Diagnosis    MULTIVITAMIN ADULT PO       Neoplasm of uncertain behavior of skin

## 2018-09-14 NOTE — NURSING NOTE
Mantoux result:  Lab Results   Component Value Date    PPDREDNESS 0 09/14/2018    PPDINDURATIO 0 09/14/2018     Irena ALANIS RN

## 2019-05-14 ENCOUNTER — OFFICE VISIT (OUTPATIENT)
Dept: OBGYN | Facility: CLINIC | Age: 44
End: 2019-05-14
Payer: COMMERCIAL

## 2019-05-14 VITALS
HEIGHT: 63 IN | TEMPERATURE: 99.1 F | RESPIRATION RATE: 18 BRPM | SYSTOLIC BLOOD PRESSURE: 116 MMHG | HEART RATE: 68 BPM | DIASTOLIC BLOOD PRESSURE: 74 MMHG | BODY MASS INDEX: 27.89 KG/M2 | WEIGHT: 157.4 LBS

## 2019-05-14 DIAGNOSIS — Z30.016 ENCOUNTER FOR INITIAL PRESCRIPTION OF TRANSDERMAL PATCH HORMONAL CONTRACEPTIVE DEVICE: ICD-10-CM

## 2019-05-14 DIAGNOSIS — Z01.419 ENCOUNTER FOR GYNECOLOGICAL EXAMINATION WITHOUT ABNORMAL FINDING: Primary | ICD-10-CM

## 2019-05-14 PROBLEM — M54.31 SCIATICA OF RIGHT SIDE: Status: ACTIVE | Noted: 2019-05-14

## 2019-05-14 LAB
ALBUMIN SERPL-MCNC: 3.7 G/DL (ref 3.4–5)
ALP SERPL-CCNC: 72 U/L (ref 40–150)
ALT SERPL W P-5'-P-CCNC: 22 U/L (ref 0–50)
ANION GAP SERPL CALCULATED.3IONS-SCNC: 5 MMOL/L (ref 3–14)
AST SERPL W P-5'-P-CCNC: 15 U/L (ref 0–45)
BILIRUB SERPL-MCNC: 0.3 MG/DL (ref 0.2–1.3)
BUN SERPL-MCNC: 13 MG/DL (ref 7–30)
CALCIUM SERPL-MCNC: 9.2 MG/DL (ref 8.5–10.1)
CHLORIDE SERPL-SCNC: 106 MMOL/L (ref 94–109)
CHOLEST SERPL-MCNC: 212 MG/DL
CO2 SERPL-SCNC: 26 MMOL/L (ref 20–32)
CREAT SERPL-MCNC: 0.72 MG/DL (ref 0.52–1.04)
ERYTHROCYTE [DISTWIDTH] IN BLOOD BY AUTOMATED COUNT: 12.5 % (ref 10–15)
GFR SERPL CREATININE-BSD FRML MDRD: >90 ML/MIN/{1.73_M2}
GLUCOSE SERPL-MCNC: 89 MG/DL (ref 70–99)
HCT VFR BLD AUTO: 38 % (ref 35–47)
HDLC SERPL-MCNC: 56 MG/DL
HGB BLD-MCNC: 13.1 G/DL (ref 11.7–15.7)
LDLC SERPL CALC-MCNC: 127 MG/DL
MCH RBC QN AUTO: 31.9 PG (ref 26.5–33)
MCHC RBC AUTO-ENTMCNC: 34.5 G/DL (ref 31.5–36.5)
MCV RBC AUTO: 93 FL (ref 78–100)
NONHDLC SERPL-MCNC: 156 MG/DL
PLATELET # BLD AUTO: 276 10E9/L (ref 150–450)
POTASSIUM SERPL-SCNC: 3.7 MMOL/L (ref 3.4–5.3)
PROT SERPL-MCNC: 6.9 G/DL (ref 6.8–8.8)
RBC # BLD AUTO: 4.11 10E12/L (ref 3.8–5.2)
SODIUM SERPL-SCNC: 137 MMOL/L (ref 133–144)
TRIGL SERPL-MCNC: 143 MG/DL
TSH SERPL DL<=0.005 MIU/L-ACNC: 1.76 MU/L (ref 0.4–4)
WBC # BLD AUTO: 7.2 10E9/L (ref 4–11)

## 2019-05-14 PROCEDURE — 85027 COMPLETE CBC AUTOMATED: CPT | Performed by: OBSTETRICS & GYNECOLOGY

## 2019-05-14 PROCEDURE — 80053 COMPREHEN METABOLIC PANEL: CPT | Performed by: OBSTETRICS & GYNECOLOGY

## 2019-05-14 PROCEDURE — 80061 LIPID PANEL: CPT | Performed by: OBSTETRICS & GYNECOLOGY

## 2019-05-14 PROCEDURE — 99386 PREV VISIT NEW AGE 40-64: CPT | Performed by: OBSTETRICS & GYNECOLOGY

## 2019-05-14 PROCEDURE — G0145 SCR C/V CYTO,THINLAYER,RESCR: HCPCS | Performed by: OBSTETRICS & GYNECOLOGY

## 2019-05-14 PROCEDURE — 87624 HPV HI-RISK TYP POOLED RSLT: CPT | Performed by: OBSTETRICS & GYNECOLOGY

## 2019-05-14 PROCEDURE — 36415 COLL VENOUS BLD VENIPUNCTURE: CPT | Performed by: OBSTETRICS & GYNECOLOGY

## 2019-05-14 PROCEDURE — 84443 ASSAY THYROID STIM HORMONE: CPT | Performed by: OBSTETRICS & GYNECOLOGY

## 2019-05-14 RX ORDER — NORELGESTROMIN AND ETHINYL ESTRADIOL 35; 150 UG/MG; UG/MG
PATCH TRANSDERMAL
Qty: 12 PATCH | Refills: 0 | Status: SHIPPED | OUTPATIENT
Start: 2019-05-14 | End: 2020-11-17

## 2019-05-14 RX ORDER — NORELGESTROMIN AND ETHINYL ESTRADIOL 35; 150 UG/MG; UG/MG
PATCH TRANSDERMAL
Qty: 1 PATCH | Refills: 11 | Status: SHIPPED | OUTPATIENT
Start: 2019-05-14 | End: 2020-11-17

## 2019-05-14 ASSESSMENT — MIFFLIN-ST. JEOR: SCORE: 1337.05

## 2019-05-14 NOTE — NURSING NOTE
"Initial /74 (BP Location: Left arm, Patient Position: Chair, Cuff Size: Adult Regular)   Pulse 68   Temp 99.1  F (37.3  C) (Tympanic)   Resp 18   Ht 1.607 m (5' 3.25\")   Wt 71.4 kg (157 lb 6.4 oz)   LMP 04/30/2019   Breastfeeding? No   BMI 27.66 kg/m   Estimated body mass index is 27.66 kg/m  as calculated from the following:    Height as of this encounter: 1.607 m (5' 3.25\").    Weight as of this encounter: 71.4 kg (157 lb 6.4 oz). .    Mariel Pfeiffer, CLARA    "

## 2019-05-14 NOTE — PROGRESS NOTES
SUBJECTIVE:   CC: Jane Andrew is an 43 year old woman who presents for preventive health visit.     Has sciatica symptoms only during the 1st 3 days of her menses.  Menses are generally monthly and sometimes quite heavy.  Is interested in trying combined hormonal contraception but doesn't want a daily pill.     Healthy Habits:    Do you get at least three servings of calcium containing foods daily (dairy, green leafy vegetables, etc.)? yes    Amount of exercise or daily activities, outside of work: 6 day(s) per week    Problems taking medications regularly No    Medication side effects: No    Have you had an eye exam in the past two years? no    Do you see a dentist twice per year? yes    Do you have sleep apnea, excessive snoring or daytime drowsiness?no      -------------------------------------    Today's PHQ-2 Score:   PHQ-2 ( 1999 [employer]) 5/14/2019 9/10/2015   Q1: Little interest or pleasure in doing things 0 0   Q2: Feeling down, depressed or hopeless 0 0   PHQ-2 Score 0 0       Abuse: Current or Past(Physical, Sexual or Emotional)- No  Do you feel safe in your environment? Yes    Social History     Tobacco Use     Smoking status: Never Smoker     Smokeless tobacco: Never Used   Substance Use Topics     Alcohol use: No     If you drink alcohol do you typically have >3 drinks per day or >7 drinks per week? No                     Reviewed orders with patient.  Reviewed health maintenance and updated orders accordingly - Yes  BP Readings from Last 3 Encounters:   05/14/19 116/74   06/06/17 124/72   05/23/17 128/70    Wt Readings from Last 3 Encounters:   05/14/19 71.4 kg (157 lb 6.4 oz)   09/10/15 72.6 kg (160 lb)   08/15/13 75.3 kg (166 lb)                    Mammogram Screening: Patient under age 50, mutual decision reflected in health maintenance.      Pertinent mammograms are reviewed under the imaging tab.  History of abnormal Pap smear:   NO - age 30-65 PAP every 5 years with negative HPV co-testing  "recommended  Last 3 Pap and HPV Results:   PAP / HPV 6/20/2012 12/19/2008 8/7/2007   PAP NIL NIL NIL     PAP / HPV 6/20/2012 12/19/2008 8/7/2007   PAP NIL NIL NIL     Reviewed and updated as needed this visit by clinical staff  Tobacco  Allergies  Meds  Med Hx  Surg Hx  Fam Hx  Soc Hx        Reviewed and updated as needed this visit by Provider    ROS:  CONSTITUTIONAL: NEGATIVE for fever, chills, change in weight  INTEGUMENTARU/SKIN: NEGATIVE for worrisome rashes, moles or lesions  EYES: NEGATIVE for vision changes or irritation  ENT: NEGATIVE for ear, mouth and throat problems  RESP: NEGATIVE for significant cough or SOB  BREAST: NEGATIVE for masses, tenderness or discharge  CV: NEGATIVE for chest pain, palpitations or peripheral edema  GI: NEGATIVE for nausea, abdominal pain, heartburn, or change in bowel habits  : NEGATIVE for unusual urinary or vaginal symptoms. Periods are regular.  MUSCULOSKELETAL: NEGATIVE for significant arthralgias or myalgia  NEURO: NEGATIVE for weakness, dizziness or paresthesias  PSYCHIATRIC: NEGATIVE for changes in mood or affect    OBJECTIVE:   /74 (BP Location: Left arm, Patient Position: Chair, Cuff Size: Adult Regular)   Pulse 68   Temp 99.1  F (37.3  C) (Tympanic)   Resp 18   Ht 1.607 m (5' 3.25\")   Wt 71.4 kg (157 lb 6.4 oz)   LMP 04/30/2019   Breastfeeding? No   BMI 27.66 kg/m    EXAM:  GENERAL: healthy, alert and no distress  EYES: Eyes grossly normal to inspection  NECK: no adenopathy, no asymmetry, masses, or scars and thyroid normal to palpation  RESP: lungs clear to auscultation - no rales, rhonchi or wheezes  BREAST: normal without masses, tenderness or nipple discharge and no palpable axillary masses or adenopathy  CV: regular rate and rhythm, normal S1 S2, no S3 or S4, no murmur, click or rub, no peripheral edema and peripheral pulses strong  ABDOMEN: soft, nontender, no hepatosplenomegaly, no masses and bowel sounds normal   (female): normal " female external genitalia, normal urethral meatus, vaginal mucosa pink, moist, well rugated, and normal cervix/adnexa/uterus without masses or discharge  MS: no gross musculoskeletal defects noted, no edema  SKIN: no suspicious lesions or rashes  NEURO: Normal strength and tone, mentation intact and speech normal  PSYCH: mentation appears normal, affect normal/bright    Diagnostic Test Results:  none     ASSESSMENT/PLAN:   1. Encounter for gynecological examination without abnormal finding  Routine health maintenance reviewed  - Pap imaged thin layer screen with HPV - recommended age 30 - 65 years (select HPV order below)  - HPV High Risk Types DNA Cervical  - Lipid Profile (Chol, Trig, HDL, LDL calc)  - CBC with platelets  - TSH with free T4 reflex  - Comprehensive metabolic panel  - MA Screening Digital Bilateral; Future    2. Encounter for initial prescription of transdermal patch hormonal contraceptive device  Discussed options of patch versus nuvaring versus depo versus IUD.  Patient amenable to trial of Evra first.  Reviewed potential side effects including but not limited to thromboembolic events, hypertension, breakthrough bleeding, GI upset, and headaches.  Reviewed proper usage.  Reviewed use of back up contraception for the 1st pack.       - norelgestromin-ethinyl estradiol (ORTHO EVRA) 150-35 MCG/24HR patch; Remove old patch and apply new patch onto the skin once a week for 3 weeks (21 days).  Dispense: 12 patch; Refill: 0  - norelgestromin-ethinyl estradiol (ORTHO EVRA) 150-35 MCG/24HR patch; Remove old patch and apply new patch if it is falling off  Dispense: 1 patch; Refill: 11    COUNSELING:   Reviewed preventive health counseling, as reflected in patient instructions  Special attention given to:        Regular exercise       Healthy diet/nutrition       Vision screening       Alcohol Use       Contraception       Family planning       Folic Acid Counseling       Osteoporosis Prevention/Bone Health    "    Safe sex practices/STD prevention       (Kelsey)menopause management    Estimated body mass index is 27.66 kg/m  as calculated from the following:    Height as of this encounter: 1.607 m (5' 3.25\").    Weight as of this encounter: 71.4 kg (157 lb 6.4 oz).         reports that she has never smoked. She has never used smokeless tobacco.      Counseling Resources:  ATP IV Guidelines  Pooled Cohorts Equation Calculator  Breast Cancer Risk Calculator  FRAX Risk Assessment  ICSI Preventive Guidelines  Dietary Guidelines for Americans, 2010  USDA's MyPlate  ASA Prophylaxis  Lung CA Screening    Sirena Traore MD  [facility]  "

## 2019-05-17 LAB
COPATH REPORT: NORMAL
PAP: NORMAL

## 2019-05-21 LAB
FINAL DIAGNOSIS: NORMAL
HPV HR 12 DNA CVX QL NAA+PROBE: NEGATIVE
HPV16 DNA SPEC QL NAA+PROBE: NEGATIVE
HPV18 DNA SPEC QL NAA+PROBE: NEGATIVE
SPECIMEN DESCRIPTION: NORMAL
SPECIMEN SOURCE CVX/VAG CYTO: NORMAL

## 2019-11-08 ENCOUNTER — HEALTH MAINTENANCE LETTER (OUTPATIENT)
Age: 44
End: 2019-11-08

## 2020-11-17 ENCOUNTER — OFFICE VISIT (OUTPATIENT)
Dept: OBGYN | Facility: CLINIC | Age: 45
End: 2020-11-17
Payer: COMMERCIAL

## 2020-11-17 ENCOUNTER — TELEPHONE (OUTPATIENT)
Dept: OBGYN | Facility: CLINIC | Age: 45
End: 2020-11-17

## 2020-11-17 VITALS
RESPIRATION RATE: 12 BRPM | HEART RATE: 83 BPM | WEIGHT: 164.3 LBS | BODY MASS INDEX: 28.05 KG/M2 | SYSTOLIC BLOOD PRESSURE: 135 MMHG | HEIGHT: 64 IN | DIASTOLIC BLOOD PRESSURE: 73 MMHG

## 2020-11-17 DIAGNOSIS — N93.9 ABNORMAL UTERINE BLEEDING (AUB): Primary | ICD-10-CM

## 2020-11-17 LAB
ERYTHROCYTE [DISTWIDTH] IN BLOOD BY AUTOMATED COUNT: 12.8 % (ref 10–15)
HCG UR QL: NEGATIVE
HCT VFR BLD AUTO: 31.1 % (ref 35–47)
HGB BLD-MCNC: 10.3 G/DL (ref 11.7–15.7)
MCH RBC QN AUTO: 32.2 PG (ref 26.5–33)
MCHC RBC AUTO-ENTMCNC: 33.1 G/DL (ref 31.5–36.5)
MCV RBC AUTO: 97 FL (ref 78–100)
PLATELET # BLD AUTO: 326 10E9/L (ref 150–450)
RBC # BLD AUTO: 3.2 10E12/L (ref 3.8–5.2)
TSH SERPL DL<=0.005 MIU/L-ACNC: 2.05 MU/L (ref 0.4–4)
WBC # BLD AUTO: 7.8 10E9/L (ref 4–11)

## 2020-11-17 PROCEDURE — 88305 TISSUE EXAM BY PATHOLOGIST: CPT | Performed by: PATHOLOGY

## 2020-11-17 PROCEDURE — 84443 ASSAY THYROID STIM HORMONE: CPT | Performed by: OBSTETRICS & GYNECOLOGY

## 2020-11-17 PROCEDURE — 81025 URINE PREGNANCY TEST: CPT | Performed by: OBSTETRICS & GYNECOLOGY

## 2020-11-17 PROCEDURE — 36415 COLL VENOUS BLD VENIPUNCTURE: CPT | Performed by: OBSTETRICS & GYNECOLOGY

## 2020-11-17 PROCEDURE — 99214 OFFICE O/P EST MOD 30 MIN: CPT | Mod: 25 | Performed by: OBSTETRICS & GYNECOLOGY

## 2020-11-17 PROCEDURE — 85027 COMPLETE CBC AUTOMATED: CPT | Performed by: OBSTETRICS & GYNECOLOGY

## 2020-11-17 PROCEDURE — 58100 BIOPSY OF UTERUS LINING: CPT | Performed by: OBSTETRICS & GYNECOLOGY

## 2020-11-17 RX ORDER — MEGESTROL ACETATE 40 MG/1
40 TABLET ORAL 3 TIMES DAILY PRN
Qty: 30 TABLET | Refills: 1 | Status: SHIPPED | OUTPATIENT
Start: 2020-11-17 | End: 2020-12-03

## 2020-11-17 ASSESSMENT — MIFFLIN-ST. JEOR: SCORE: 1370.26

## 2020-11-17 NOTE — NURSING NOTE
"Initial /73 (BP Location: Right arm, Patient Position: Sitting, Cuff Size: Adult Regular)   Pulse 83   Resp 12   Ht 1.626 m (5' 4\")   Wt 74.5 kg (164 lb 4.8 oz)   LMP 11/01/2020 (Approximate)   Breastfeeding No   BMI 28.20 kg/m   Estimated body mass index is 28.2 kg/m  as calculated from the following:    Height as of this encounter: 1.626 m (5' 4\").    Weight as of this encounter: 74.5 kg (164 lb 4.8 oz). .      "

## 2020-11-17 NOTE — PROGRESS NOTES
[facility]    Gynecology Office Note    CC:   Chief Complaint   Patient presents with     Consult     Heavy bleeding         HPI: Jane Andrew is a 45 year old  who presents for abnormal uterine bleeding. Reports about twice per year she is getting extrememly heavy menses, lasting a few weeks. These episodes are usually preceded by an absent or very light menses the month prior.  Current bleeding episode is going on three weeks. She is still having blood clots. No severe cramping. Outside of these intermittent episodes, reports normal monthly menses. Is not currently on any contraception or hormonal therapy. Was previously prescribed contraceptive patches but insurance stopped covering this. Also in the past she had tried Depo provera but had prolonged bleeding with that.     GYN Hx:     Patient is menopausal: no  Reports her mother went through menopause in her 40s.    Patient's last menstrual period was 2020 (approximate).  Contraception: None   Last Pap Smear:   Lab Results   Component Value Date    PAP NIL 2019     No known significant GYN history.    ROS: A 10 pt ROS was completed and found to be otherwise negative unless mentioned in the HPI.     PMH:   Past Medical History:   Diagnosis Date     Chickenpox        PSHx:   Past Surgical History:   Procedure Laterality Date     HC TOOTH EXTRACTION W/FORCEP      wisdom teeth        OBHx:   OB History    Para Term  AB Living   5 4 4 0 1 4   SAB TAB Ectopic Multiple Live Births   1 0 0 0 4      # Outcome Date GA Lbr Ivan/2nd Weight Sex Delivery Anes PTL Lv   5 Term 13 39w3d 17:00 / 00:43 4.026 kg (8 lb 14 oz) F Vag-Spont EPI  KAYLA      Name: SEEMA ANDREW      Apgar1: 8  Apgar5: 9   4 Term 09 39w0d 07:25 3.28 kg (7 lb 3.7 oz) F  EPI N KAYLA      Name: Latonia      Apgar1: 9  Apgar5: 9   3 Term 05 40w0d  3.43 kg (7 lb 9 oz) F    KAYLA      Name: Alysia   2 SAB 04 8w0d    AB,  COMPLETE   DEC      Birth Comments: sab   1 Term 02 38w0d  3.09 kg (6 lb 13 oz) F    KAYLA      Birth Comments: srom      Name: Melina      Obstetric Comments    x 2       Medications:        FERROUS SULFATE PO,        Multiple Vitamins-Minerals (MULTIVITAMIN ADULT PO),     No current facility-administered medications on file prior to visit.       Allergies:      Allergies   Allergen Reactions     Penicillins Hives     As an infant       Social History:   Social History     Socioeconomic History     Marital status:      Spouse name: Not on file     Number of children: Not on file     Years of education: Not on file     Highest education level: Not on file   Occupational History     Not on file   Social Needs     Financial resource strain: Not on file     Food insecurity     Worry: Not on file     Inability: Not on file     Transportation needs     Medical: Not on file     Non-medical: Not on file   Tobacco Use     Smoking status: Never Smoker     Smokeless tobacco: Never Used   Substance and Sexual Activity     Alcohol use: No     Drug use: No     Sexual activity: Yes     Partners: Male     Birth control/protection: Condom   Lifestyle     Physical activity     Days per week: Not on file     Minutes per session: Not on file     Stress: Not on file   Relationships     Social connections     Talks on phone: Not on file     Gets together: Not on file     Attends Spiritism service: Not on file     Active member of club or organization: Not on file     Attends meetings of clubs or organizations: Not on file     Relationship status: Not on file     Intimate partner violence     Fear of current or ex partner: Not on file     Emotionally abused: Not on file     Physically abused: Not on file     Forced sexual activity: Not on file   Other Topics Concern     Parent/sibling w/ CABG, MI or angioplasty before 65F 55M? No   Social History Narrative     Not on file         Family History:   Family History  "  Problem Relation Age of Onset     Hypertension Mother      Squamous cell carcinoma Mother         Unknown source, internal, malignant     Heart Disease Father         CHF     Hypertension Father      Cancer Maternal Grandfather         Lung     Heart Disease Maternal Grandfather      Genitourinary Problems Paternal Grandfather      Genetic Disorder Maternal Aunt         fathers twin had spina bifida       Physical Exam:   Vitals:    11/17/20 1125   BP: 135/73   BP Location: Right arm   Patient Position: Sitting   Cuff Size: Adult Regular   Pulse: 83   Resp: 12   Weight: 74.5 kg (164 lb 4.8 oz)   Height: 1.626 m (5' 4\")      Estimated body mass index is 28.2 kg/m  as calculated from the following:    Height as of this encounter: 1.626 m (5' 4\").    Weight as of this encounter: 74.5 kg (164 lb 4.8 oz).    General appearance: well-hydrated, A&O x 3, no apparent distress  Neck: Appropriate symmetry, thyroid not enlarged  Lungs: Equal expansion bilaterally, no accessory muscle use  Heart: No heaves or thrills. No peripheral varicosities  Skin: No rash, lesions, ulcers  Constitutional: See vitals  Abdomen: Soft, non-tender, non-distended. No rebound, rigidity, or guarding.  Extremities: no edema, no calf tenderness  Neuro: CN II-XII grossly intact  Genitourinary:  External genitalia: no erythema, no lesions.   Urethral meatus appropriate location without lesions or prolapse  Urethra: No masses, tenderness, or scarring  Bladder no fullness, masses, or tenderness.  Anus and Perineum: Unremarkable, no visible lesions  Vagina: Normal, healthy pink mucosa without any lesions. +bleeding from cervical os.  Cervix: normal appearance, no cervical motion tenderness.   Uterus: slightly enlarged, 10 week size, normal shape and consistency.   Adnexa: no masses or tenderness bilaterally.      Endometrial Biopsy Procedure Note      Jane Andrew  1975  [id number]    Indications:    Jane Andrew is a 45 year old year old " female, who is having an endometrial biopsy for abnormal uterine bleeding    Procedure:  Is a pregnancy test required: Yes.  Was it positive or negative?  Negative    Prior to the start of the procedure, written and verbal consent was obtained from the patient. The patient was then placed in the dorsal lithotomy position.  A speculum was placed in the vagina and the cervix visualized. The cervix was cleaned with betadine swabs x3. A small plastic 5 mm Pipelle syringe curette was passed through the cervix to the fundus with return of moderate amount of tissue and blood. This was placed in specimen jar and sent for permanent pathology. All instruments were removed.      The patient tolerated the procedure well and she reported there was no cramping.      Post Procedure:    PLAN : Await the results of the biopsy.  She tolerated the procedure well. There were no complications. Patient was discharged in stable condition.    The patient was given post op instructions which included activity and pelvic restrictions.  She was advised to call the clinic if excessive bleeding, pelvic pain, or fever.     Follow-up based on results.             Assessment and Plan:     Encounter Diagnosis   Name Primary?     Abnormal uterine bleeding (AUB) Yes     Discussed possible etiologies for AUB including anovulation, endometrial polyp or hyperplasia, or fibroids. She could be having intermittent anovulation, approaching perimenopausal state (mother with history of menopause in 40s). EMB performed today to evaluate for endometrial pathology. Also ordered pelvic US to evaluate for structural anomaly. CBC and TSH checked today. Will wait for results to determine plan but did discuss options. Possible need for D&C if polyp or submucosal fibroid. Discussed hormonal and surgical options to control AUB. Patient interested in IUD. Plan for this if EMB and US are WNL.      Meron Chao DO

## 2020-11-17 NOTE — TELEPHONE ENCOUNTER
Return call to patient.  Spoke with patient on the phone.    Patient would like to schedule an appointment for heavy bleeding that she is experiencing. Patient reports this has been going on now for 2 weeks entering into week #3. Patient reports earlier today, larger gush and soaked through her clothing. Patient reports last week feeling symptoms of anemia so started an iron supplement on her own. Patient reports symptoms have improved. Patient reports physically feeling well.    Appointment scheduled for today at 11:15 am.  Pt in agreement and reports understanding.      Ludivina Perdomo   [facility]yn Clinic  RN

## 2020-11-17 NOTE — TELEPHONE ENCOUNTER
Reason for Call:  Other call back    Detailed comments: pt calling stating she has been having heavy for about 2 weeks. She has been taking an iron pill since last week Thursday.. Would like to know if she should come in for an appt?    Phone Number Patient can be reached at: Cell [phone]  Best Time: any     Can we leave a detailed message on this number? YES    Call taken on 11/17/2020 at 8:58 AM by Aurelia Pierre

## 2020-11-20 ENCOUNTER — HOSPITAL ENCOUNTER (OUTPATIENT)
Dept: ULTRASOUND IMAGING | Facility: CLINIC | Age: 45
Discharge: HOME | End: 2020-11-20
Attending: OBSTETRICS & GYNECOLOGY | Admitting: OBSTETRICS & GYNECOLOGY
Payer: COMMERCIAL

## 2020-11-20 DIAGNOSIS — N93.9 ABNORMAL UTERINE BLEEDING (AUB): ICD-10-CM

## 2020-11-20 LAB — COPATH REPORT: NORMAL

## 2020-11-20 PROCEDURE — 76856 US EXAM PELVIC COMPLETE: CPT

## 2020-11-23 ENCOUNTER — TELEPHONE (OUTPATIENT)
Dept: OBGYN | Facility: CLINIC | Age: 45
End: 2020-11-23

## 2020-11-23 ENCOUNTER — PREP FOR PROCEDURE (OUTPATIENT)
Dept: OBGYN | Facility: CLINIC | Age: 45
End: 2020-11-23

## 2020-11-23 DIAGNOSIS — N93.9 ABNORMAL UTERINE BLEEDING (AUB): Primary | ICD-10-CM

## 2020-11-23 DIAGNOSIS — R93.89 THICKENED ENDOMETRIUM: ICD-10-CM

## 2020-11-23 RX ORDER — KETOROLAC TROMETHAMINE 30 MG/ML
30 INJECTION, SOLUTION INTRAMUSCULAR; INTRAVENOUS ONCE
Status: CANCELLED | OUTPATIENT
Start: 2020-11-23 | End: 2020-11-23

## 2020-11-23 RX ORDER — ACETAMINOPHEN 325 MG/1
975 TABLET ORAL ONCE
Status: CANCELLED | OUTPATIENT
Start: 2020-11-23 | End: 2020-11-23

## 2020-11-23 NOTE — TELEPHONE ENCOUNTER
Left a message for patient to call back to pick a date for surgery.  Tentatively scheduled for 12-7-20.    -Rianna MARSHALL [facility]  [facility]

## 2020-11-23 NOTE — TELEPHONE ENCOUNTER
Called patient to discuss results of EMB and pelvic US. Discussed that while her biopsy was negative, US still showed thickened endometrium. Possible polyp or missed pathology. Discussed options of IUD placement or D&C with hysteroscopy +/- IUD placement. Patient would like to proceed with D&C with hysteroscopy and IUD placement in OR. Discussed need for PCP clearance and COVID testing prior to OR. 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 Will check to see if she can get a copy of these results. Tentatively plan for surgery 12/7.

## 2020-11-23 NOTE — TELEPHONE ENCOUNTER
"  You are now scheduled for surgery at [facility].  Below are the details for your surgery.  Please read the \"Preparing for Your Surgery\" instructions and let us know if you have any questions.    Type of surgery: HYSTEROSCOPY, WITH DILATION AND CURETTAGE OF UTERUS Insertion of Mirena intrauerine device   Surgeon:  Meron Chao DO  Location of surgery: [facility] OR    Date of surgery: 12-7-20    Time: 1:00PM   Arrival Time: 12:00PM    Time can change, to be confirmed a couple of days prior by pre-op surgery nurse.    Pre-Op Appt Date: Patient to schedule with a PCP or Family Practice Provider within 30 days to the surgery.  Post-Op Appt Date: To be determined by provider     COVID test 2-4 days prior at [facility]  Date 12-3-20  Time 12:00 Noon    Packet sent out: Yes  Pre-cert/Authorization completed:  TBD by Financial Securing Office.   MA Sterilization/Hysterectomy Acknowledgment Consent signed: Not Applicable    [facility]  [phone]    Fax: [phone]  Same Day Surgery [phone]  Fax: [phone]  [facility] [phone]    "

## 2020-11-24 DIAGNOSIS — Z11.59 ENCOUNTER FOR SCREENING FOR OTHER VIRAL DISEASES: Primary | ICD-10-CM

## 2020-12-02 ENCOUNTER — ANESTHESIA EVENT (OUTPATIENT)
Dept: SURGERY | Facility: CLINIC | Age: 45
End: 2020-12-02
Payer: COMMERCIAL

## 2020-12-03 ENCOUNTER — OFFICE VISIT (OUTPATIENT)
Dept: FAMILY MEDICINE | Facility: CLINIC | Age: 45
End: 2020-12-03
Payer: COMMERCIAL

## 2020-12-03 VITALS
SYSTOLIC BLOOD PRESSURE: 120 MMHG | HEART RATE: 72 BPM | BODY MASS INDEX: 28.17 KG/M2 | TEMPERATURE: 98 F | HEIGHT: 64 IN | WEIGHT: 165 LBS | RESPIRATION RATE: 16 BRPM | DIASTOLIC BLOOD PRESSURE: 80 MMHG | OXYGEN SATURATION: 98 %

## 2020-12-03 DIAGNOSIS — D64.9 ANEMIA, UNSPECIFIED TYPE: ICD-10-CM

## 2020-12-03 DIAGNOSIS — N93.9 ABNORMAL UTERINE BLEEDING (AUB): ICD-10-CM

## 2020-12-03 DIAGNOSIS — Z11.59 ENCOUNTER FOR SCREENING FOR OTHER VIRAL DISEASES: ICD-10-CM

## 2020-12-03 DIAGNOSIS — Z01.818 PREOP GENERAL PHYSICAL EXAM: Primary | ICD-10-CM

## 2020-12-03 LAB
ERYTHROCYTE [DISTWIDTH] IN BLOOD BY AUTOMATED COUNT: 12.5 % (ref 10–15)
HCT VFR BLD AUTO: 32.8 % (ref 35–47)
HGB BLD-MCNC: 10.8 G/DL (ref 11.7–15.7)
MCH RBC QN AUTO: 31.4 PG (ref 26.5–33)
MCHC RBC AUTO-ENTMCNC: 32.9 G/DL (ref 31.5–36.5)
MCV RBC AUTO: 95 FL (ref 78–100)
PLATELET # BLD AUTO: 363 10E9/L (ref 150–450)
RBC # BLD AUTO: 3.44 10E12/L (ref 3.8–5.2)
WBC # BLD AUTO: 9.9 10E9/L (ref 4–11)

## 2020-12-03 PROCEDURE — 99203 OFFICE O/P NEW LOW 30 MIN: CPT | Performed by: FAMILY MEDICINE

## 2020-12-03 PROCEDURE — 85027 COMPLETE CBC AUTOMATED: CPT | Performed by: FAMILY MEDICINE

## 2020-12-03 PROCEDURE — 36415 COLL VENOUS BLD VENIPUNCTURE: CPT | Performed by: FAMILY MEDICINE

## 2020-12-03 PROCEDURE — U0003 INFECTIOUS AGENT DETECTION BY NUCLEIC ACID (DNA OR RNA); SEVERE ACUTE RESPIRATORY SYNDROME CORONAVIRUS 2 (SARS-COV-2) (CORONAVIRUS DISEASE [COVID-19]), AMPLIFIED PROBE TECHNIQUE, MAKING USE OF HIGH THROUGHPUT TECHNOLOGIES AS DESCRIBED BY CMS-2020-01-R: HCPCS | Performed by: OBSTETRICS & GYNECOLOGY

## 2020-12-03 ASSESSMENT — MIFFLIN-ST. JEOR: SCORE: 1373.44

## 2020-12-03 NOTE — PATIENT INSTRUCTIONS
"Cleared for surgery pending results of CBC       Preparing for Your Surgery  Getting started  A surgery nurse will call you to review your health history and instructions. They will give you an arrival time based on your scheduled surgery time.  Please be ready to share the following:    Your doctor's clinic name and phone number    Your medical, surgical and anesthesia history    A list of allergies and sensitivities    A list of medicines, including herbal treatments and over-the-counter drugs    Whether the patient has a legal guardian (ask how to send us the papers in advance)  If your child is having surgery, please ask for a copy of Preparing for Your Child's Surgery.    Preparing for surgery    Within 30 days of surgery: Have an exam at your family clinic (primary care clinic), or go to a pre-operative clinic. This exam is called a \"History and Physical,\" or H&P.    At your H&P exam, talk to your care team about all medicines you take. If you need to stop any medicines before surgery, ask when to start taking them again.  ? We do this for your safety. Many medicines can make you bleed too much during surgery. Some change how well surgery (anesthesia) drugs work.    Call your insurance company to see what it will and won't pay for. Ask if they need to pre-approve the surgery. (If no insurance, call [phone].)    Call your surgeon's clinic if there's any change in your health. This includes signs of a cold or flu (sore throat, runny nose, cough, rash, fever). It also includes a scrape or scratch near the surgery site.    If you have questions on the day of surgery, call your surgery center.  Eating and drinking guidelines  For your safety: Unless your surgeon tells you otherwise, follow the guidelines below.    Eat and drink as usual until 8 hours before surgery. After that, no food or milk.    Drink clear liquids until 2 hours before surgery. These are liquids you can see through, like water, Gatorade and " Propel Water. You may also have black coffee and tea (no cream or milk).    Nothing by mouth within 2 hours of surgery. This includes gum, candy and breath mints.    Stop alcohol the midnight before surgery.    If your family clinic tells you to take medicine on the morning of surgery, it's okay to take it with a sip of water.  Preventing infection    Shower or bathe the night before and morning of your surgery. Follow the instructions your clinic gave you. (If no instructions, use regular soap.)    Don't shave or clip hair near your surgery site. This can lead to skin infection.    Don't smoke the morning of surgery. Smoking increases the risk of infection. You may chew nicotine gum up to 2 hours before surgery. A nicotine patch is okay.  ? Note: Some surgeries require you to completely quit smoking and nicotine. Check with your surgeon.    Your care team will make every effort to keep you safe from infection. We will:  ? Clean our hands often with soap and water (or an alcohol-based hand rub).  ? Clean the skin at your surgery site with a special soap that kills germs. We'll also remove hair from the site as needed.  ? Wear special hair covers, masks, gowns and gloves during surgery.  ? Give antibiotic medicine, if prescribed. Not all surgeries need antibiotics.  What to bring on the day of surgery    Photo ID and insurance card    Copy of your health care directive, if you have one    Glasses and hearing aides (bring cases)  ? You can't wear contacts during surgery    Inhaler and eye drops, if you use them (tell us about these when you arrive)    CPAP machine or breathing device, if you use them    A few personal items, if spending the night    If you have . . .  ? A pacemaker or ICD (cardiac defibrillator): Bring the ID card.  ? An implanted stimulator: Bring the remote control.  ? A legal guardian: Bring a copy of the certified (court-stamped) guardianship papers.  Please remove any jewelry, including body  piercings. Leave jewelry and other valuables at home.  If you're going home the day of surgery  Important: If you don't follow the rules below, we must cancel your surgery.     Arrange for someone to drive you home after surgery. You may not drive, take a taxi or take public transportation by yourself (unless you'll have local anesthesia only).    Arrange for a responsible adult to stay with you overnight. If you don't, we may keep you in the hospital overnight, and you may need to pay the costs yourself.  Questions?   If you have any questions for your care team, list them here: _________________________________________________________________________________________________________________________________________________________________________________________________________________________________________________________________________________________________________________________  For informational purposes only. Not to replace the advice of your health care provider. Copyright   1774-1936 [facility]. All rights reserved. Clinically reviewed by Devorah Blanco MD. [employer] 314640 - REV 07/19.

## 2020-12-03 NOTE — RESULT ENCOUNTER NOTE
Most recent hemoglobin has returned at 10.8.  This is slightly increased from 2 weeks ago when it was 10.3.  Continue with plans to proceed with procedure as planned.    Estrada Blanchard, DO

## 2020-12-03 NOTE — PROGRESS NOTES
[facility]  [address]  [facility] [address]  Phone: [phone]  Primary Provider: Bernice Anglin  Pre-op Performing Provider: SKIP LU    PREOPERATIVE EVALUATION:  Today's date: 12/3/2020    Jane Andrew is a 45 year old female who presents for a preoperative evaluation.    Surgical Information:  Surgery/Procedure: hysteroscopy with D and C and IUD insertion  Surgery Location: [facility]  Surgeon: Dr. Chao  Surgery Date: 12/7/2020  Time of Surgery: 7:30am  Where patient plans to recover: At home with family  Fax number for surgical facility: Note does not need to be faxed, will be available electronically in Epic.    Type of Anesthesia Anticipated: General    Subjective     HPI related to upcoming procedure: Bleeding 1 month ago. Heavy bleeding and clotting. Recommended doing a hysteroscopy with D&C by OBGYN. See Telephone encounter on 11/23/2020 by Dr. Chao    METS>4   No ACS symptoms at rest or with activity.     Preop Questions 12/3/2020   1. Have you ever had a heart attack or stroke? No   2. Have you ever had surgery on your heart or blood vessels, such as a stent placement, a coronary artery bypass, or surgery on an artery in your head, neck, heart, or legs? No   3. Do you have chest pain with activity? No   4. Do you have a history of  heart failure? No   5. Do you currently have a cold, bronchitis or symptoms of other infection? No   6. Do you have a cough, shortness of breath, or wheezing? No   7. Do you or anyone in your family have previous history of blood clots? No   8. Do you or does anyone in your family have a serious bleeding problem such as prolonged bleeding following surgeries or cuts? No   9. Have you ever had problems with anemia or been told to take iron pills? YES - heavy bleeding currently.    10. Have you had any abnormal blood loss such as black, tarry or bloody stools, or abnormal vaginal bleeding? YES - vaginal  bleeding    11. Have you ever had a blood transfusion? No   12. Are you willing to have a blood transfusion if it is medically needed before, during, or after your surgery? Yes   13. Have you or any of your relatives ever had problems with anesthesia? No   14. Do you have sleep apnea, excessive snoring or daytime drowsiness? No   15. Do you have any artifical heart valves or other implanted medical devices like a pacemaker, defibrillator, or continuous glucose monitor? No   16. Do you have artificial joints? No   17. Are you allergic to latex? No   18. Is there any chance that you may be pregnant? No       Health Care Directive:  Patient does not have a Health Care Directive or Living Will: Patient declines.      Preoperative Review of :   reviewed - no record of controlled substances prescribed.          Review of Systems  Constitutional: Negative fevers, chills, night sweats  HEENT: Negative vision changes, hearing changes, difficulty with swallowing.  CV: Negative chest pain, palpitations.  Resp: Negative shortness of breath, significant cough, wheezing  GI: Negative nausea, vomiting, diarrhea, constipation, blood in stool  : vaginal bleeding.  Integumentary: Negative rash   Psych: Negative mood changes       Patient Active Problem List    Diagnosis Date Noted     Abnormal uterine bleeding (AUB) 11/23/2020     Priority: Medium     Added automatically from request for surgery [id number]       Thickened endometrium 11/23/2020     Priority: Medium     Added automatically from request for surgery [id number]       Sciatica of right side 05/14/2019     Priority: Medium     Happens with menstruation       Stomatitis and mucositis 09/10/2015     Priority: Medium     CARDIOVASCULAR SCREENING; LDL GOAL LESS THAN 160 10/31/2010     Priority: Medium      Past Medical History:   Diagnosis Date     Chickenpox      Past Surgical History:   Procedure Laterality Date     HC TOOTH EXTRACTION W/FORCEP      wisdom teeth   "    Current Outpatient Medications   Medication Sig Dispense Refill     FERROUS SULFATE PO Take 28 mg by mouth        Multiple Vitamins-Minerals (MULTIVITAMIN ADULT PO)          Allergies   Allergen Reactions     Penicillins Hives     As an infant        Social History     Tobacco Use     Smoking status: Never Smoker     Smokeless tobacco: Never Used   Substance Use Topics     Alcohol use: No     History   Drug Use No         Objective     /80 (BP Location: Right arm, Patient Position: Chair, Cuff Size: Adult Regular)   Pulse 72   Temp 98  F (36.7  C) (Tympanic)   Resp 16   Ht 1.626 m (5' 4\")   Wt 74.8 kg (165 lb)   LMP 11/30/2020 (Approximate)   SpO2 98%   BMI 28.32 kg/m      Physical Exam  General: Alert, cooperative, no acute distress  Head: Normocephalic, atraumatic   Eyes: PERRL, no scleral icterus, no conjunctival injection   Ears: External ear without deformity, external canals patent, TM intact with no signs of infection   Nose: nares patent  Throat: Oropharynx clear, non-erythematous, tonsils non-enlarged   Neck: supple, trachea midline, no goiter   CV: RRR, no murmur   Lungs: Clear, non-labored breathing, No rales or rhonchi   Abdomen: Bowel sounds active, soft, non-tender, no guarding.   Extremities: No peripheral edema, calves non-tender   MSK: strength intact in upper and lower extremities. Gait normal.   Neuro: CN II-XII grossly intact. No focal deficits. Sensation intact.       Recent Labs   Lab Test 11/17/20  1209 05/14/19  1147   HGB 10.3* 13.1    276   NA  --  137   POTASSIUM  --  3.7   CR  --  0.72        Diagnostics:  CBC ordered today     Revised Cardiac Risk Index (RCRI):  The patient has the following serious cardiovascular risks for perioperative complications:   - No serious cardiac risks = 0 points     RCRI Interpretation: 0 points: Class I (very low risk - 0.4% complication rate)           Assessment & Plan   The proposed surgical procedure is considered INTERMEDIATE " risk.    Jane was seen today for pre-op exam.    Diagnoses and all orders for this visit:    Preop general physical exam  - METS>4   No ACS symptoms at rest or with activity.   No NSAIDS   Covid test scheduled.   Abnormal uterine bleeding (AUB)  -     CBC with platelets    Anemia, unspecified type  - Continues to have bleeding at this time. She believes it is related her menstrual cycle. Will obtain CBC today.   -     CBC with platelets      RECOMMENDATION:  APPROVAL GIVEN to proceed with proposed procedure pending review of diagnostic evaluation.    Signed Electronically by: Estrada Blanchard DO    Copy of this evaluation report is provided to requesting physician.    Preop [facility] Preop Guidelines    Revised Cardiac Risk Index

## 2020-12-03 NOTE — H&P (VIEW-ONLY)
[facility]  [address]  [facility] [address]  Phone: [phone]  Primary Provider: Bernice Anglin  Pre-op Performing Provider: SKIP LU    PREOPERATIVE EVALUATION:  Today's date: 12/3/2020    Jane Andrew is a 45 year old female who presents for a preoperative evaluation.    Surgical Information:  Surgery/Procedure: hysteroscopy with D and C and IUD insertion  Surgery Location: [facility]  Surgeon: Dr. Chao  Surgery Date: 12/7/2020  Time of Surgery: 7:30am  Where patient plans to recover: At home with family  Fax number for surgical facility: Note does not need to be faxed, will be available electronically in Epic.    Type of Anesthesia Anticipated: General    Subjective     HPI related to upcoming procedure: Bleeding 1 month ago. Heavy bleeding and clotting. Recommended doing a hysteroscopy with D&C by OBGYN. See Telephone encounter on 11/23/2020 by Dr. Chao    METS>4   No ACS symptoms at rest or with activity.     Preop Questions 12/3/2020   1. Have you ever had a heart attack or stroke? No   2. Have you ever had surgery on your heart or blood vessels, such as a stent placement, a coronary artery bypass, or surgery on an artery in your head, neck, heart, or legs? No   3. Do you have chest pain with activity? No   4. Do you have a history of  heart failure? No   5. Do you currently have a cold, bronchitis or symptoms of other infection? No   6. Do you have a cough, shortness of breath, or wheezing? No   7. Do you or anyone in your family have previous history of blood clots? No   8. Do you or does anyone in your family have a serious bleeding problem such as prolonged bleeding following surgeries or cuts? No   9. Have you ever had problems with anemia or been told to take iron pills? YES - heavy bleeding currently.    10. Have you had any abnormal blood loss such as black, tarry or bloody stools, or abnormal vaginal bleeding? YES - vaginal  bleeding    11. Have you ever had a blood transfusion? No   12. Are you willing to have a blood transfusion if it is medically needed before, during, or after your surgery? Yes   13. Have you or any of your relatives ever had problems with anesthesia? No   14. Do you have sleep apnea, excessive snoring or daytime drowsiness? No   15. Do you have any artifical heart valves or other implanted medical devices like a pacemaker, defibrillator, or continuous glucose monitor? No   16. Do you have artificial joints? No   17. Are you allergic to latex? No   18. Is there any chance that you may be pregnant? No       Health Care Directive:  Patient does not have a Health Care Directive or Living Will: Patient declines.      Preoperative Review of :   reviewed - no record of controlled substances prescribed.          Review of Systems  Constitutional: Negative fevers, chills, night sweats  HEENT: Negative vision changes, hearing changes, difficulty with swallowing.  CV: Negative chest pain, palpitations.  Resp: Negative shortness of breath, significant cough, wheezing  GI: Negative nausea, vomiting, diarrhea, constipation, blood in stool  : vaginal bleeding.  Integumentary: Negative rash   Psych: Negative mood changes       Patient Active Problem List    Diagnosis Date Noted     Abnormal uterine bleeding (AUB) 11/23/2020     Priority: Medium     Added automatically from request for surgery [id number]       Thickened endometrium 11/23/2020     Priority: Medium     Added automatically from request for surgery [id number]       Sciatica of right side 05/14/2019     Priority: Medium     Happens with menstruation       Stomatitis and mucositis 09/10/2015     Priority: Medium     CARDIOVASCULAR SCREENING; LDL GOAL LESS THAN 160 10/31/2010     Priority: Medium      Past Medical History:   Diagnosis Date     Chickenpox      Past Surgical History:   Procedure Laterality Date     HC TOOTH EXTRACTION W/FORCEP      wisdom teeth   "    Current Outpatient Medications   Medication Sig Dispense Refill     FERROUS SULFATE PO Take 28 mg by mouth        Multiple Vitamins-Minerals (MULTIVITAMIN ADULT PO)          Allergies   Allergen Reactions     Penicillins Hives     As an infant        Social History     Tobacco Use     Smoking status: Never Smoker     Smokeless tobacco: Never Used   Substance Use Topics     Alcohol use: No     History   Drug Use No         Objective     /80 (BP Location: Right arm, Patient Position: Chair, Cuff Size: Adult Regular)   Pulse 72   Temp 98  F (36.7  C) (Tympanic)   Resp 16   Ht 1.626 m (5' 4\")   Wt 74.8 kg (165 lb)   LMP 11/30/2020 (Approximate)   SpO2 98%   BMI 28.32 kg/m      Physical Exam  General: Alert, cooperative, no acute distress  Head: Normocephalic, atraumatic   Eyes: PERRL, no scleral icterus, no conjunctival injection   Ears: External ear without deformity, external canals patent, TM intact with no signs of infection   Nose: nares patent  Throat: Oropharynx clear, non-erythematous, tonsils non-enlarged   Neck: supple, trachea midline, no goiter   CV: RRR, no murmur   Lungs: Clear, non-labored breathing, No rales or rhonchi   Abdomen: Bowel sounds active, soft, non-tender, no guarding.   Extremities: No peripheral edema, calves non-tender   MSK: strength intact in upper and lower extremities. Gait normal.   Neuro: CN II-XII grossly intact. No focal deficits. Sensation intact.       Recent Labs   Lab Test 11/17/20  1209 05/14/19  1147   HGB 10.3* 13.1    276   NA  --  137   POTASSIUM  --  3.7   CR  --  0.72        Diagnostics:  CBC ordered today     Revised Cardiac Risk Index (RCRI):  The patient has the following serious cardiovascular risks for perioperative complications:   - No serious cardiac risks = 0 points     RCRI Interpretation: 0 points: Class I (very low risk - 0.4% complication rate)           Assessment & Plan   The proposed surgical procedure is considered INTERMEDIATE " risk.    Jane was seen today for pre-op exam.    Diagnoses and all orders for this visit:    Preop general physical exam  - METS>4   No ACS symptoms at rest or with activity.   No NSAIDS   Covid test scheduled.   Abnormal uterine bleeding (AUB)  -     CBC with platelets    Anemia, unspecified type  - Continues to have bleeding at this time. She believes it is related her menstrual cycle. Will obtain CBC today.   -     CBC with platelets      RECOMMENDATION:  APPROVAL GIVEN to proceed with proposed procedure pending review of diagnostic evaluation.    Signed Electronically by: Estrada Blanchard DO    Copy of this evaluation report is provided to requesting physician.    Preop [facility] Preop Guidelines    Revised Cardiac Risk Index

## 2020-12-04 LAB
SARS-COV-2 RNA SPEC QL NAA+PROBE: NOT DETECTED
SPECIMEN SOURCE: NORMAL

## 2020-12-06 ENCOUNTER — HEALTH MAINTENANCE LETTER (OUTPATIENT)
Age: 45
End: 2020-12-06

## 2020-12-07 ENCOUNTER — ANESTHESIA (OUTPATIENT)
Dept: SURGERY | Facility: CLINIC | Age: 45
End: 2020-12-07
Payer: COMMERCIAL

## 2020-12-07 ENCOUNTER — HOSPITAL ENCOUNTER (OUTPATIENT)
Facility: CLINIC | Age: 45
Discharge: HOME | End: 2020-12-07
Attending: OBSTETRICS & GYNECOLOGY | Admitting: OBSTETRICS & GYNECOLOGY
Payer: COMMERCIAL

## 2020-12-07 VITALS
OXYGEN SATURATION: 93 % | WEIGHT: 165 LBS | HEIGHT: 64 IN | TEMPERATURE: 98 F | HEART RATE: 70 BPM | RESPIRATION RATE: 14 BRPM | SYSTOLIC BLOOD PRESSURE: 102 MMHG | BODY MASS INDEX: 28.17 KG/M2 | DIASTOLIC BLOOD PRESSURE: 60 MMHG

## 2020-12-07 DIAGNOSIS — Z30.430 ENCOUNTER FOR IUD INSERTION: ICD-10-CM

## 2020-12-07 DIAGNOSIS — Z98.890 S/P DILATION AND CURETTAGE: Primary | ICD-10-CM

## 2020-12-07 DIAGNOSIS — N93.9 ABNORMAL UTERINE BLEEDING (AUB): ICD-10-CM

## 2020-12-07 DIAGNOSIS — R93.89 THICKENED ENDOMETRIUM: ICD-10-CM

## 2020-12-07 LAB
ABO + RH BLD: NORMAL
ABO + RH BLD: NORMAL
BLD GP AB SCN SERPL QL: NORMAL
BLOOD BANK CMNT PATIENT-IMP: NORMAL
HCG UR QL: NEGATIVE
SPECIMEN EXP DATE BLD: NORMAL

## 2020-12-07 PROCEDURE — 360N000021 HC SURGERY LEVEL 3 EA 15 ADDTL MIN: Performed by: OBSTETRICS & GYNECOLOGY

## 2020-12-07 PROCEDURE — 88305 TISSUE EXAM BY PATHOLOGIST: CPT | Mod: 26 | Performed by: PATHOLOGY

## 2020-12-07 PROCEDURE — 250N000009 HC RX 250: Performed by: NURSE ANESTHETIST, CERTIFIED REGISTERED

## 2020-12-07 PROCEDURE — C1888 ENDOVAS NON-CARDIAC ABL CATH: HCPCS | Performed by: OBSTETRICS & GYNECOLOGY

## 2020-12-07 PROCEDURE — 86850 RBC ANTIBODY SCREEN: CPT | Performed by: OBSTETRICS & GYNECOLOGY

## 2020-12-07 PROCEDURE — 250N000013 HC RX MED GY IP 250 OP 250 PS 637: Performed by: NURSE ANESTHETIST, CERTIFIED REGISTERED

## 2020-12-07 PROCEDURE — 250N000011 HC RX IP 250 OP 636: Performed by: OBSTETRICS & GYNECOLOGY

## 2020-12-07 PROCEDURE — 86901 BLOOD TYPING SEROLOGIC RH(D): CPT | Performed by: OBSTETRICS & GYNECOLOGY

## 2020-12-07 PROCEDURE — 761N000007 HC RECOVERY PHASE 2 EACH 15 MINS: Performed by: OBSTETRICS & GYNECOLOGY

## 2020-12-07 PROCEDURE — 58300 INSERT INTRAUTERINE DEVICE: CPT | Performed by: OBSTETRICS & GYNECOLOGY

## 2020-12-07 PROCEDURE — 360N000020 HC SURGERY LEVEL 3 1ST 30 MIN: Performed by: OBSTETRICS & GYNECOLOGY

## 2020-12-07 PROCEDURE — 370N000001 HC ANESTHESIA TECHNICAL FEE, 1ST 30 MIN: Performed by: OBSTETRICS & GYNECOLOGY

## 2020-12-07 PROCEDURE — 36415 COLL VENOUS BLD VENIPUNCTURE: CPT | Performed by: OBSTETRICS & GYNECOLOGY

## 2020-12-07 PROCEDURE — 272N000001 HC OR GENERAL SUPPLY STERILE: Performed by: OBSTETRICS & GYNECOLOGY

## 2020-12-07 PROCEDURE — 999N000135 HC STATISTIC PRE PROC ASSESS I: Performed by: OBSTETRICS & GYNECOLOGY

## 2020-12-07 PROCEDURE — 58558 HYSTEROSCOPY BIOPSY: CPT | Performed by: OBSTETRICS & GYNECOLOGY

## 2020-12-07 PROCEDURE — 258N000003 HC RX IP 258 OP 636: Performed by: NURSE ANESTHETIST, CERTIFIED REGISTERED

## 2020-12-07 PROCEDURE — 250N000011 HC RX IP 250 OP 636: Performed by: NURSE ANESTHETIST, CERTIFIED REGISTERED

## 2020-12-07 PROCEDURE — 81025 URINE PREGNANCY TEST: CPT | Performed by: OBSTETRICS & GYNECOLOGY

## 2020-12-07 PROCEDURE — 86900 BLOOD TYPING SEROLOGIC ABO: CPT | Performed by: OBSTETRICS & GYNECOLOGY

## 2020-12-07 PROCEDURE — 370N000002 HC ANESTHESIA TECHNICAL FEE, EACH ADDTL 15 MIN: Performed by: OBSTETRICS & GYNECOLOGY

## 2020-12-07 PROCEDURE — 88305 TISSUE EXAM BY PATHOLOGIST: CPT | Mod: TC | Performed by: OBSTETRICS & GYNECOLOGY

## 2020-12-07 DEVICE — IUD CONTRACEPTIVE DEVICE MIRENA 50419-4230-01: Type: IMPLANTABLE DEVICE | Site: ABDOMEN | Status: FUNCTIONAL

## 2020-12-07 RX ORDER — DEXAMETHASONE SODIUM PHOSPHATE 10 MG/ML
4 INJECTION, SOLUTION INTRAMUSCULAR; INTRAVENOUS ONCE
Status: CANCELLED | OUTPATIENT
Start: 2020-12-07

## 2020-12-07 RX ORDER — HYDROXYZINE HYDROCHLORIDE 25 MG/1
25 TABLET, FILM COATED ORAL EVERY 6 HOURS PRN
Status: DISCONTINUED | OUTPATIENT
Start: 2020-12-07 | End: 2020-12-07 | Stop reason: HOSPADM

## 2020-12-07 RX ORDER — FENTANYL CITRATE 50 UG/ML
25-50 INJECTION, SOLUTION INTRAMUSCULAR; INTRAVENOUS
Status: CANCELLED | OUTPATIENT
Start: 2020-12-07

## 2020-12-07 RX ORDER — DEXAMETHASONE SODIUM PHOSPHATE 4 MG/ML
INJECTION, SOLUTION INTRA-ARTICULAR; INTRALESIONAL; INTRAMUSCULAR; INTRAVENOUS; SOFT TISSUE PRN
Status: DISCONTINUED | OUTPATIENT
Start: 2020-12-07 | End: 2020-12-07

## 2020-12-07 RX ORDER — NALOXONE HYDROCHLORIDE 0.4 MG/ML
0.2 INJECTION, SOLUTION INTRAMUSCULAR; INTRAVENOUS; SUBCUTANEOUS
Status: CANCELLED | OUTPATIENT
Start: 2020-12-07

## 2020-12-07 RX ORDER — HYDROMORPHONE HYDROCHLORIDE 1 MG/ML
.3-.5 INJECTION, SOLUTION INTRAMUSCULAR; INTRAVENOUS; SUBCUTANEOUS EVERY 10 MIN PRN
Status: CANCELLED | OUTPATIENT
Start: 2020-12-07

## 2020-12-07 RX ORDER — ONDANSETRON 4 MG/1
4 TABLET, ORALLY DISINTEGRATING ORAL
Status: CANCELLED | OUTPATIENT
Start: 2020-12-07

## 2020-12-07 RX ORDER — GLYCOPYRROLATE 0.2 MG/ML
INJECTION, SOLUTION INTRAMUSCULAR; INTRAVENOUS PRN
Status: DISCONTINUED | OUTPATIENT
Start: 2020-12-07 | End: 2020-12-07

## 2020-12-07 RX ORDER — ONDANSETRON 4 MG/1
4 TABLET, ORALLY DISINTEGRATING ORAL EVERY 30 MIN PRN
Status: CANCELLED | OUTPATIENT
Start: 2020-12-07

## 2020-12-07 RX ORDER — ACETAMINOPHEN 325 MG/1
975 TABLET ORAL ONCE
Status: COMPLETED | OUTPATIENT
Start: 2020-12-07 | End: 2020-12-07

## 2020-12-07 RX ORDER — LIDOCAINE HYDROCHLORIDE 10 MG/ML
INJECTION, SOLUTION INFILTRATION; PERINEURAL PRN
Status: DISCONTINUED | OUTPATIENT
Start: 2020-12-07 | End: 2020-12-07

## 2020-12-07 RX ORDER — HYDROXYZINE HYDROCHLORIDE 50 MG/1
50 TABLET, FILM COATED ORAL EVERY 6 HOURS PRN
Status: DISCONTINUED | OUTPATIENT
Start: 2020-12-07 | End: 2020-12-07 | Stop reason: HOSPADM

## 2020-12-07 RX ORDER — NALOXONE HYDROCHLORIDE 0.4 MG/ML
0.4 INJECTION, SOLUTION INTRAMUSCULAR; INTRAVENOUS; SUBCUTANEOUS
Status: CANCELLED | OUTPATIENT
Start: 2020-12-07

## 2020-12-07 RX ORDER — LIDOCAINE 40 MG/G
CREAM TOPICAL
Status: DISCONTINUED | OUTPATIENT
Start: 2020-12-07 | End: 2020-12-07 | Stop reason: HOSPADM

## 2020-12-07 RX ORDER — SODIUM CHLORIDE, SODIUM LACTATE, POTASSIUM CHLORIDE, CALCIUM CHLORIDE 600; 310; 30; 20 MG/100ML; MG/100ML; MG/100ML; MG/100ML
INJECTION, SOLUTION INTRAVENOUS CONTINUOUS
Status: DISCONTINUED | OUTPATIENT
Start: 2020-12-07 | End: 2020-12-07 | Stop reason: HOSPADM

## 2020-12-07 RX ORDER — IBUPROFEN 600 MG/1
600 TABLET, FILM COATED ORAL EVERY 6 HOURS PRN
Qty: 30 TABLET | Refills: 0 | Status: SHIPPED | OUTPATIENT
Start: 2020-12-07

## 2020-12-07 RX ORDER — CELECOXIB 200 MG/1
200 CAPSULE ORAL ONCE
Status: DISCONTINUED | OUTPATIENT
Start: 2020-12-07 | End: 2020-12-07 | Stop reason: HOSPADM

## 2020-12-07 RX ORDER — PROPOFOL 10 MG/ML
INJECTION, EMULSION INTRAVENOUS CONTINUOUS PRN
Status: DISCONTINUED | OUTPATIENT
Start: 2020-12-07 | End: 2020-12-07

## 2020-12-07 RX ORDER — FENTANYL CITRATE 50 UG/ML
INJECTION, SOLUTION INTRAMUSCULAR; INTRAVENOUS PRN
Status: DISCONTINUED | OUTPATIENT
Start: 2020-12-07 | End: 2020-12-07

## 2020-12-07 RX ORDER — OXYCODONE HYDROCHLORIDE 5 MG/1
10 TABLET ORAL EVERY 4 HOURS PRN
Status: CANCELLED | OUTPATIENT
Start: 2020-12-07

## 2020-12-07 RX ORDER — GABAPENTIN 300 MG/1
300 CAPSULE ORAL ONCE
Status: COMPLETED | OUTPATIENT
Start: 2020-12-07 | End: 2020-12-07

## 2020-12-07 RX ORDER — MEPERIDINE HYDROCHLORIDE 25 MG/ML
12.5 INJECTION INTRAMUSCULAR; INTRAVENOUS; SUBCUTANEOUS
Status: CANCELLED | OUTPATIENT
Start: 2020-12-07

## 2020-12-07 RX ORDER — ACETAMINOPHEN 325 MG/1
975 TABLET ORAL ONCE
Status: DISCONTINUED | OUTPATIENT
Start: 2020-12-07 | End: 2020-12-07

## 2020-12-07 RX ORDER — ONDANSETRON 2 MG/ML
INJECTION INTRAMUSCULAR; INTRAVENOUS PRN
Status: DISCONTINUED | OUTPATIENT
Start: 2020-12-07 | End: 2020-12-07

## 2020-12-07 RX ORDER — SODIUM CHLORIDE, SODIUM LACTATE, POTASSIUM CHLORIDE, CALCIUM CHLORIDE 600; 310; 30; 20 MG/100ML; MG/100ML; MG/100ML; MG/100ML
INJECTION, SOLUTION INTRAVENOUS CONTINUOUS
Status: CANCELLED | OUTPATIENT
Start: 2020-12-07

## 2020-12-07 RX ORDER — KETOROLAC TROMETHAMINE 30 MG/ML
30 INJECTION, SOLUTION INTRAMUSCULAR; INTRAVENOUS ONCE
Status: COMPLETED | OUTPATIENT
Start: 2020-12-07 | End: 2020-12-07

## 2020-12-07 RX ORDER — ONDANSETRON 2 MG/ML
4 INJECTION INTRAMUSCULAR; INTRAVENOUS EVERY 30 MIN PRN
Status: CANCELLED | OUTPATIENT
Start: 2020-12-07

## 2020-12-07 RX ORDER — OXYCODONE HYDROCHLORIDE 5 MG/1
5 TABLET ORAL
Status: CANCELLED | OUTPATIENT
Start: 2020-12-07

## 2020-12-07 RX ADMIN — GLYCOPYRROLATE 0.1 MG: 0.2 INJECTION, SOLUTION INTRAMUSCULAR; INTRAVENOUS at 07:32

## 2020-12-07 RX ADMIN — FENTANYL CITRATE 100 MCG: 50 INJECTION, SOLUTION INTRAMUSCULAR; INTRAVENOUS at 07:29

## 2020-12-07 RX ADMIN — ONDANSETRON 4 MG: 2 INJECTION INTRAMUSCULAR; INTRAVENOUS at 07:43

## 2020-12-07 RX ADMIN — GABAPENTIN 300 MG: 300 CAPSULE ORAL at 07:02

## 2020-12-07 RX ADMIN — GLYCOPYRROLATE 0.1 MG: 0.2 INJECTION, SOLUTION INTRAMUSCULAR; INTRAVENOUS at 07:27

## 2020-12-07 RX ADMIN — KETOROLAC TROMETHAMINE 30 MG: 30 INJECTION, SOLUTION INTRAMUSCULAR at 07:02

## 2020-12-07 RX ADMIN — SODIUM CHLORIDE, POTASSIUM CHLORIDE, SODIUM LACTATE AND CALCIUM CHLORIDE 10 ML: 600; 310; 30; 20 INJECTION, SOLUTION INTRAVENOUS at 07:20

## 2020-12-07 RX ADMIN — MIDAZOLAM HYDROCHLORIDE 2 MG: 1 INJECTION, SOLUTION INTRAMUSCULAR; INTRAVENOUS at 07:27

## 2020-12-07 RX ADMIN — LIDOCAINE HYDROCHLORIDE 100 MG: 10 INJECTION, SOLUTION INFILTRATION; PERINEURAL at 07:29

## 2020-12-07 RX ADMIN — MIDAZOLAM HYDROCHLORIDE 2 MG: 1 INJECTION, SOLUTION INTRAMUSCULAR; INTRAVENOUS at 07:29

## 2020-12-07 RX ADMIN — PROPOFOL 75 MCG/KG/MIN: 10 INJECTION, EMULSION INTRAVENOUS at 07:29

## 2020-12-07 RX ADMIN — ACETAMINOPHEN 975 MG: 325 TABLET, FILM COATED ORAL at 07:01

## 2020-12-07 RX ADMIN — LIDOCAINE HYDROCHLORIDE 1 ML: 10 INJECTION, SOLUTION EPIDURAL; INFILTRATION; INTRACAUDAL; PERINEURAL at 07:20

## 2020-12-07 RX ADMIN — MIDAZOLAM HYDROCHLORIDE 1 MG: 1 INJECTION, SOLUTION INTRAMUSCULAR; INTRAVENOUS at 07:32

## 2020-12-07 RX ADMIN — DEXAMETHASONE SODIUM PHOSPHATE 4 MG: 4 INJECTION, SOLUTION INTRA-ARTICULAR; INTRALESIONAL; INTRAMUSCULAR; INTRAVENOUS; SOFT TISSUE at 07:32

## 2020-12-07 ASSESSMENT — MIFFLIN-ST. JEOR: SCORE: 1373.44

## 2020-12-07 NOTE — ADDENDUM NOTE
Addendum  created 12/07/20 1150 by Yanelis Fernandez APRN CRNA    Intraprocedure Event edited, Intraprocedure Staff edited

## 2020-12-07 NOTE — OP NOTE
[facility] Operative Note    Patient Name: Jane Andrew  MRN:[id number]  : 1975  Date of Surgery: 20   Pre-operative diagnosis:  Abnormal uterine bleeding (AUB) [N93.9]  Thickened endometrium [R93.89]   Post-operative diagnosis:  S/p dilation and curettage with hysteroscopy and IUD insertion   Procedure:  Procedure(s):  HYSTEROSCOPY, WITH DILATION AND CURETTAGE OF UTERUS Insertion of Mirena intrauerine device   Surgeon:  Dr. Meron Chao (OB/GYN Attending Staff)    Assistant(s):  None   Anesthesia:  TIVA   Estimated blood loss:  Minimal, <5cc   Total IV fluids:  500ml crystaloid    Blood transfusion:  No transfusion was given during surgery    Total urine output:  Voided prior to entry to OR   Drains:  None   Specimens:  ID Type Source Tests Collected by Time Destination   A : endometrial curretings Tissue Uterus SURGICAL PATHOLOGY EXAM Meron Chao,  2020  7:53 AM       Indication:  Patient is a 45 year old  with history of abnormal menstrual bleeding, has been very heavy leading to blood loss anemia.     Prior to procedure risks benefits, complications, and alternatives were discussed with the patient.  Verbal and written consent were obtained.   Complications:  None apparent   Condition:  Stable, transferred to PACU        Findings: External Genitalia: Normal appearing female genitalia. Lax perineum.   Bimanual Examination: The uterus was noted to be bulky, approximately 10 wks size, freely mobile.  Grade 2 uterine prolapse. Good vaginal rugae. No adnexal masses or fullness were appreciated.      Hysteroscopy: Revealed diffusely thickened tissue, no polyps or submucosal fibroids.  Curettage: Moderate amount of tissue.   Procedure:  Patient was met in the preoperative suite, where history and physical were updated and consent re-obtained verbally.  The patient was taken to the operating suite where general anesthesia was administered without  difficulty. The patient was then positioned on the operating table in the dorsal lithotomy position and the legs supported using stirrups. All pressure points were padded. The patient was then prepped and draped in the usual sterile fashion. A time out was performed to confirm correct patient and procedure. A bimanual exam was performed with the above noted findings.     The weighted speculum was then inserted into the vagina. A right angle retractor was also used to visualize the cervix. The anterior lip of the cervix was grasped using a double-tooth tenaculum. The uterus was sounded to 10cm. The cervix was adequately dilated using Malissa dilators for the introduction of the Slimline hysteroscope. The hysteroscope was introduced under direct visualization using normal saline solution as the distending media. Hysteroscope was advanced to the fundus and the entire uterine cavity was inspected with the above-noted findings. The hysteroscope was then removed and a sharp curetting was performed across all surfaces of the endometrial cavity. Endometrial tissue was obtained and was sent to pathology. The Mirena intrauterine device was then opened and inserted in the normal fashion. Strings were cut approximately 2-3cm from the external cervical os. The tenaculum was removed from the anterior lip of the cervix. Excellent hemostasis was observed from the tenaculum site. The weighted speculum was then removed from the vagina.    At the end of the procedure, all needle, sponge, and instrument counts were noted to be correct ×2. A debrief was performed. The patient tolerated the procedure well and was transferred to the recovery room in stable condition.       [facility] HospitalDO

## 2020-12-07 NOTE — ANESTHESIA CARE TRANSFER NOTE
Patient: Jane Andrew    Procedure(s):  HYSTEROSCOPY, WITH DILATION AND CURETTAGE OF UTERUS Insertion of Mirena intrauerine device    Diagnosis: Abnormal uterine bleeding (AUB) [N93.9]  Thickened endometrium [R93.89]  Diagnosis Additional Information: No value filed.    Anesthesia Type:   MAC     Note:  Airway :Room Air  Patient transferred to:Phase II  Handoff Report: Identifed the Patient, Identified the Reponsible Provider, Reviewed the pertinent medical history, Discussed the surgical course, Reviewed Intra-OP anesthesia mangement and issues during anesthesia, Set expectations for post-procedure period and Allowed opportunity for questions and acknowledgement of understanding      Vitals: (Last set prior to Anesthesia Care Transfer)    CRNA VITALS  12/7/2020 0732 - 12/7/2020 0807      12/7/2020             Pulse:  108    SpO2:  (!) 89 %    Resp Rate (observed):  (!) 3                Electronically Signed By: DIMITRI Serrato CRNA  December 7, 2020  8:07 AM

## 2020-12-07 NOTE — ANESTHESIA PREPROCEDURE EVALUATION
Anesthesia Pre-Procedure Evaluation    Patient: Jane Andrew   MRN: [id number] : 1975          Preoperative Diagnosis: Abnormal uterine bleeding (AUB) [N93.9]  Thickened endometrium [R93.89]    Procedure(s):  HYSTEROSCOPY, WITH DILATION AND CURETTAGE OF UTERUS Insertion of Mirena intrauerine device    Past Medical History:   Diagnosis Date     Chickenpox      Past Surgical History:   Procedure Laterality Date     HC TOOTH EXTRACTION W/FORCEP      wisdom teeth        Anesthesia Evaluation     . Pt has had prior anesthetic. Type: MAC    No history of anesthetic complications          ROS/MED HX    ENT/Pulmonary:  - neg pulmonary ROS     Neurologic:  - neg neurologic ROS     Cardiovascular:  - neg cardiovascular ROS       METS/Exercise Tolerance:  >4 METS   Hematologic:  - neg hematologic  ROS       Musculoskeletal:  - neg musculoskeletal ROS       GI/Hepatic:  - neg GI/hepatic ROS       Renal/Genitourinary:  - ROS Renal section negative       Endo:     (+) Other Endocrine Disorder overweight.      Psychiatric:  - neg psychiatric ROS       Infectious Disease:  - neg infectious disease ROS       Malignancy:      - no malignancy   Other:    (+) No chance of pregnancy   - neg other ROS                      Physical Exam  Normal systems: cardiovascular, pulmonary and dental    Airway   Mallampati: I  TM distance: >3 FB  Neck ROM: full    Dental     Cardiovascular       Pulmonary             Lab Results   Component Value Date    WBC 9.9 2020    HGB 10.8 (L) 2020    HCT 32.8 (L) 2020     2020     2019    POTASSIUM 3.7 2019    CHLORIDE 106 2019    CO2 26 2019    BUN 13 2019    CR 0.72 2019    GLC 89 2019    MELINDA 9.2 2019    ALBUMIN 3.7 2019    PROTTOTAL 6.9 2019    ALT 22 2019    AST 15 2019    ALKPHOS 72 2019    BILITOTAL 0.3 2019    TSH 2.05 2020    HCG Negative 2020       Preop  "Vitals  BP Readings from Last 3 Encounters:   12/07/20 (!) 148/82   12/03/20 120/80   11/17/20 135/73    Pulse Readings from Last 3 Encounters:   12/03/20 72   11/17/20 83   05/14/19 68      Resp Readings from Last 3 Encounters:   12/07/20 16   12/03/20 16   11/17/20 12    SpO2 Readings from Last 3 Encounters:   12/07/20 98%   12/03/20 98%   06/06/17 95%      Temp Readings from Last 1 Encounters:   12/07/20 36.7  C (98  F) (Oral)    Ht Readings from Last 1 Encounters:   12/07/20 1.626 m (5' 4\")      Wt Readings from Last 1 Encounters:   12/07/20 74.8 kg (165 lb)    Estimated body mass index is 28.32 kg/m  as calculated from the following:    Height as of this encounter: 1.626 m (5' 4\").    Weight as of this encounter: 74.8 kg (165 lb).       Anesthesia Plan      History & Physical Review  History and physical reviewed and following examination; no interval change.    ASA Status:  1 .    NPO Status:  > 6 hours    Plan for MAC Maintenance will be Balanced.  Reason for MAC:  Deep or markedly invasive procedure (G8)  PONV prophylaxis:  Ondansetron (or other 5HT-3) and Dexamethasone or Solumedrol         Postoperative Care  Postoperative pain management:  IV analgesics, Oral pain medications and Multi-modal analgesia.      Consents  Anesthetic plan, risks, benefits and alternatives discussed with:  Patient..                 DIMITRI Serrato CRNA  "

## 2020-12-07 NOTE — ANESTHESIA POSTPROCEDURE EVALUATION
Patient: Jane Andrew    Procedure(s):  HYSTEROSCOPY, WITH DILATION AND CURETTAGE OF UTERUS Insertion of Mirena intrauerine device    Diagnosis:Abnormal uterine bleeding (AUB) [N93.9]  Thickened endometrium [R93.89]  Diagnosis Additional Information: No value filed.    Anesthesia Type:  MAC    Note:  Anesthesia Post Evaluation    Patient location during evaluation: Phase 2  Patient participation: Able to fully participate in evaluation  Level of consciousness: awake and alert  Pain management: adequate  Airway patency: patent  Cardiovascular status: acceptable and hemodynamically stable  Respiratory status: acceptable, room air and spontaneous ventilation  Hydration status: acceptable  PONV: none     Anesthetic complications: None          Last vitals:  Vitals:    12/07/20 0845 12/07/20 0900 12/07/20 0915   BP: 109/62 110/62 102/60   Pulse: 71 67 70   Resp: 14 14 14   Temp: 36.7  C (98  F)     SpO2: 96% 94% 93%         Electronically Signed By: DIMITRI Serrato CRNA  December 7, 2020  11:06 AM

## 2020-12-07 NOTE — DISCHARGE INSTRUCTIONS
Same Day Surgery Discharge Instructions  Special Precautions After Surgery - Adult    1. It is not unusual to feel lightheaded or faint, up to 24 hours after surgery or while taking pain medication.  If you have these symptoms; sit for a few minutes before standing and have someone assist you when getting up.  2. You should rest and relax for the next 24 hours and must have someone stay with you for at least 24 hours after your discharge.  3. DO NOT DRIVE any vehicle or operate mechanical equipment for 24 hours following the end of your surgery.  DO NOT DRIVE while taking narcotic pain medications that have been prescribed by your physician.  If you had a limb operated on, you must be able to use it fully to drive.  4. DO NOT drink alcoholic beverages for 24 hours following surgery or while taking prescription pain medication.  5. Drink clear liquids (apple juice, ginger ale, broth, 7-Up, etc.).  Progress to your regular diet as you feel able.  6. Any questions call your physician and do not make important decisions for 24 hours.     __________________________________________________________________________________________________________________________________  IMPORTANT NUMBERS:    [facility] Main Number:  [phone], [phone]  Pharmacy:  [phone]  Same Day Surgery:  [phone], Monday - Friday until 8:30 p.m.   Clinic:  [phone]

## 2020-12-07 NOTE — BRIEF OP NOTE
[facility]     Brief Operative Note    Pre-operative diagnosis: Abnormal uterine bleeding (AUB) [N93.9]  Thickened endometrium [R93.89]  Post-operative diagnosis Same, now S/p dilation and curettage with hysteroscopy and IUD insertion    Procedure: Procedure(s):  HYSTEROSCOPY, WITH DILATION AND CURETTAGE OF UTERUS Insertion of Mirena intrauerine device  Surgeon: Surgeon(s) and Role:     * Meron Chao DO - Primary  Anesthesia: TIVA  Estimated blood loss: Minimal, <5cc  Drains: None  Specimens:   ID Type Source Tests Collected by Time Destination   A : endometrial curretings Tissue Uterus SURGICAL PATHOLOGY EXAM Meron Chao DO 12/7/2020  7:53 AM      Findings:   Bulky uterus that sounded to 10cm, diffusely thickened endometrium without polyp or fibroid, IUD placed without difficulty  Complications: None.  Implants:   Implant Name Type Inv. Item Serial No.  Lot No. LRB No. Used Action   IUD CONTRACEPTIVE DEVICE MIRENA [id number] Contraceptive Device IUD CONTRACEPTIVE DEVICE MIRENA [id number]  [facility] [id number] N/A 1 Implanted         Meron Chao DO

## 2020-12-09 LAB — COPATH REPORT: NORMAL

## 2021-02-20 ENCOUNTER — HEALTH MAINTENANCE LETTER (OUTPATIENT)
Age: 46
End: 2021-02-20

## 2021-09-25 ENCOUNTER — HEALTH MAINTENANCE LETTER (OUTPATIENT)
Age: 46
End: 2021-09-25

## 2021-11-22 NOTE — NURSING NOTE
The patient is asked the following questions today and these are her answers:    -Have you had a mantoux administered in the past 30 days?    No  -Have you had a previous positive Mantoux.  No  -Have you received BCG in the past.  No  -Have you had a live vaccine  (MMR, Varicella, OPV, Yellow Fever) in the last 6 weeks.  No  -Have you had and active  viral or bacterial infection in the past 6 weeks.  No  -Have you received corticosteroids or immunosuppressive agents in the past 6 weeks.  No  -Have you been diagnosed with HIV?  No  -Do you have a maglinancy?  No     Prior to injection verified patient identity using patient's name and date of birth.  Due to injection administration, patient instructed to remain in clinic for 15 minutes  afterwards, and to report any adverse reaction to me immediately.    Isha Massey CMA    
Attending Attestation (For Attendings USE Only)...

## 2022-01-15 ENCOUNTER — HEALTH MAINTENANCE LETTER (OUTPATIENT)
Age: 47
End: 2022-01-15

## 2022-03-12 ENCOUNTER — HEALTH MAINTENANCE LETTER (OUTPATIENT)
Age: 47
End: 2022-03-12

## 2023-01-07 ENCOUNTER — HEALTH MAINTENANCE LETTER (OUTPATIENT)
Age: 48
End: 2023-01-07

## 2023-04-22 ENCOUNTER — HEALTH MAINTENANCE LETTER (OUTPATIENT)
Age: 48
End: 2023-04-22

## 2023-09-13 ENCOUNTER — TRANSFERRED RECORDS (OUTPATIENT)
Dept: HEALTH INFORMATION MANAGEMENT | Facility: CLINIC | Age: 48
End: 2023-09-13
Payer: COMMERCIAL

## 2023-09-27 ENCOUNTER — TRANSFERRED RECORDS (OUTPATIENT)
Dept: HEALTH INFORMATION MANAGEMENT | Facility: CLINIC | Age: 48
End: 2023-09-27
Payer: COMMERCIAL

## 2024-05-10 ENCOUNTER — OFFICE VISIT (OUTPATIENT)
Dept: OBSTETRICS AND GYNECOLOGY | Facility: CLINIC | Age: 49
End: 2024-05-10
Payer: COMMERCIAL

## 2024-05-10 ENCOUNTER — LAB (OUTPATIENT)
Dept: LAB | Facility: LAB | Age: 49
End: 2024-05-10
Payer: COMMERCIAL

## 2024-05-10 VITALS
DIASTOLIC BLOOD PRESSURE: 78 MMHG | BODY MASS INDEX: 19.7 KG/M2 | WEIGHT: 133 LBS | HEIGHT: 69 IN | SYSTOLIC BLOOD PRESSURE: 110 MMHG

## 2024-05-10 DIAGNOSIS — Z01.419 ENCOUNTER FOR ANNUAL ROUTINE GYNECOLOGICAL EXAMINATION: Primary | ICD-10-CM

## 2024-05-10 DIAGNOSIS — Z12.4 SCREENING FOR CERVICAL CANCER: ICD-10-CM

## 2024-05-10 DIAGNOSIS — N63.41 SUBAREOLAR LUMP OF RIGHT BREAST: ICD-10-CM

## 2024-05-10 DIAGNOSIS — E03.9 HYPOTHYROIDISM, UNSPECIFIED TYPE: ICD-10-CM

## 2024-05-10 DIAGNOSIS — E28.39 PREMATURE OVARIAN FAILURE: ICD-10-CM

## 2024-05-10 LAB
T4 FREE SERPL-MCNC: 0.97 NG/DL (ref 0.78–1.48)
TSH SERPL-ACNC: 4.66 MIU/L (ref 0.44–3.98)

## 2024-05-10 PROCEDURE — 36415 COLL VENOUS BLD VENIPUNCTURE: CPT

## 2024-05-10 PROCEDURE — 1036F TOBACCO NON-USER: CPT | Performed by: OBSTETRICS & GYNECOLOGY

## 2024-05-10 PROCEDURE — 88142 CYTOPATH C/V THIN LAYER: CPT

## 2024-05-10 PROCEDURE — 99386 PREV VISIT NEW AGE 40-64: CPT | Performed by: OBSTETRICS & GYNECOLOGY

## 2024-05-10 PROCEDURE — 84443 ASSAY THYROID STIM HORMONE: CPT

## 2024-05-10 PROCEDURE — 87624 HPV HI-RISK TYP POOLED RSLT: CPT

## 2024-05-10 PROCEDURE — 84439 ASSAY OF FREE THYROXINE: CPT

## 2024-05-10 ASSESSMENT — PAIN SCALES - GENERAL: PAINLEVEL: 0-NO PAIN

## 2024-05-10 NOTE — PROGRESS NOTES
"Assessment     PLAN  1. Encounter for annual routine gynecological examination    2. Premature ovarian failure  - Dx at age 31yo. Has never been on HRT. Prescribed in 2017 but did not start. Declines prescription today. Recommend DEXA scan to evaluate bone density  - XR DEXA bone density; Future  - Referral to Reproductive Endocrinology; Future    3. Hypothyroidism, unspecified type  - Referral to Reproductive Endocrinology; Future  - TSH with reflex to Free T4 if abnormal; Future    4. Subareolar lump of right breast  - previous lumpectomy, small nodular area behind right areola. recommend diagnostic imaging and ultrasound.   - BI mammo bilateral diagnostic tomosynthesis; Future  - BI US breast limited right; Future    5. Screening for cervical cancer  - THINPREP PAP TEST (>30)    Please return for your next visit in 1 year or sooner as needed.    Teresa Hoover MD      Subjective     Stephie Cardona is a 48 y.o. female who is here for a routine exam.   PCP = No primary care provider on file.    APE Concerns: None    Gynecologic History:    GYN History: premature ovarian failure at age 31yo, never on HRT, history of endometriosis dx on laparoscopy, ablated  OBHx:  x 3, 20, 19, 15yo  Menses: amenorrheic  Last Pap: 2017 -  neg  HPV Vaccine: Denies  History of Dysplasia: denies  Sexually active: active with , no concerns  STI testing: denies  Contraception:  had vasectomy  Mammogram: history of lumpectomy x 4 at one time - reports all benign  FamHx of GYN cancers:  grandma had breast cancer in her 80s, mother had uterine pre-cancer       PMH, PSH, FH, SH, medications and allergies reviewed and edited as needed.      Objective   /78   Ht 1.753 m (5' 9\")   Wt 60.3 kg (133 lb)   BMI 19.64 kg/m²      General:   Alert and oriented, in no acute distress   Neck: Supple. No visible thyromegaly.    Breast/Axilla: Normal to palpation bilaterally without masses, skin changes, or nipple discharge.  "   Abdomen: Soft, non-tender, without masses or organomegaly   Vulva: Normal architecture without erythema, masses, or lesions.    Vagina: Normal mucosa without lesions, masses, or atrophy. No abnormal vaginal discharge.    Cervix: Normal without masses, lesions, or signs of cervicitis.    Uterus: Normal mobile, non-enlarged uterus    Adnexa: Normal without masses or lesions   Pelvic Floor No POP noted. No high tone pelvic floor   Psych Normal affect. Normal mood.

## 2024-05-13 DIAGNOSIS — E03.9 HYPOTHYROIDISM, UNSPECIFIED TYPE: Primary | ICD-10-CM

## 2024-05-22 LAB
CYTOLOGY CMNT CVX/VAG CYTO-IMP: NORMAL
HPV HR 12 DNA GENITAL QL NAA+PROBE: NEGATIVE
HPV HR GENOTYPES PNL CVX NAA+PROBE: NEGATIVE
HPV16 DNA SPEC QL NAA+PROBE: NEGATIVE
HPV18 DNA SPEC QL NAA+PROBE: NEGATIVE
LAB AP HPV GENOTYPE QUESTION: YES
LAB AP HPV HR: NORMAL
LABORATORY COMMENT REPORT: NORMAL
LABORATORY COMMENT REPORT: NORMAL
PATH REPORT.TOTAL CANCER: NORMAL

## 2024-06-05 ENCOUNTER — HOSPITAL ENCOUNTER (OUTPATIENT)
Dept: RADIOLOGY | Facility: CLINIC | Age: 49
Discharge: HOME | End: 2024-06-05
Payer: COMMERCIAL

## 2024-06-05 DIAGNOSIS — E28.39 PREMATURE OVARIAN FAILURE: ICD-10-CM

## 2024-06-05 PROCEDURE — 77080 DXA BONE DENSITY AXIAL: CPT

## 2024-06-06 DIAGNOSIS — N63.41 SUBAREOLAR LUMP OF RIGHT BREAST: ICD-10-CM

## 2024-10-10 ENCOUNTER — APPOINTMENT (OUTPATIENT)
Dept: ENDOCRINOLOGY | Facility: CLINIC | Age: 49
End: 2024-10-10
Payer: COMMERCIAL

## 2025-02-26 ENCOUNTER — APPOINTMENT (OUTPATIENT)
Dept: RADIOLOGY | Facility: HOSPITAL | Age: 50
End: 2025-02-26
Payer: COMMERCIAL

## 2025-02-26 ENCOUNTER — APPOINTMENT (OUTPATIENT)
Dept: ORTHOPEDIC SURGERY | Facility: CLINIC | Age: 50
End: 2025-02-26
Payer: COMMERCIAL

## 2025-02-26 DIAGNOSIS — M47.816 LUMBAR SPONDYLOSIS: Primary | ICD-10-CM

## 2025-03-06 ENCOUNTER — APPOINTMENT (OUTPATIENT)
Dept: ENDOCRINOLOGY | Facility: CLINIC | Age: 50
End: 2025-03-06
Payer: COMMERCIAL

## 2025-03-12 ENCOUNTER — APPOINTMENT (OUTPATIENT)
Dept: ORTHOPEDIC SURGERY | Facility: CLINIC | Age: 50
End: 2025-03-12
Payer: COMMERCIAL

## 2025-07-11 ENCOUNTER — OFFICE VISIT (OUTPATIENT)
Dept: URGENT CARE | Age: 50
End: 2025-07-11
Payer: COMMERCIAL

## 2025-07-11 VITALS
OXYGEN SATURATION: 97 % | BODY MASS INDEX: 19.85 KG/M2 | RESPIRATION RATE: 18 BRPM | DIASTOLIC BLOOD PRESSURE: 86 MMHG | SYSTOLIC BLOOD PRESSURE: 131 MMHG | HEART RATE: 87 BPM | WEIGHT: 134.4 LBS | TEMPERATURE: 98.3 F

## 2025-07-11 DIAGNOSIS — R10.32 LEFT LOWER QUADRANT PAIN: Primary | ICD-10-CM

## 2025-07-11 RX ORDER — ONDANSETRON 4 MG/1
4 TABLET, FILM COATED ORAL EVERY 8 HOURS PRN
Qty: 10 TABLET | Refills: 0 | Status: SHIPPED | OUTPATIENT
Start: 2025-07-11 | End: 2025-07-18

## 2025-07-11 ASSESSMENT — ENCOUNTER SYMPTOMS: ABDOMINAL PAIN: 1

## 2025-07-11 ASSESSMENT — PAIN SCALES - GENERAL: PAINLEVEL_OUTOF10: 6

## 2025-07-11 NOTE — PATIENT INSTRUCTIONS
Please follow up with your primary provider within one week if symptoms do not improve.  You may schedule an appointment online at [email or web address] or call [phone]. Go to the Emergency Department if symptoms significantly worsen    I recommend omeprazole as directed

## 2025-07-11 NOTE — PROGRESS NOTES
Subjective   Patient ID: Stephie Cardona is a 50 y.o. female. They present today with a chief complaint of Abdominal Pain and Nausea (Over 2 weeks).    History of Present Illness  States she first noticed a dull aching pain in her left lower quadrant that has been intermittent for the past 2 and half weeks.  States her son had a viral GI illness few days prior to the onset of her symptoms.  Endorses intermittent nausea without vomiting.  States her stools have been occasionally loose, but never diarrhea or constipation.  Denies abdominal pain elsewhere in the abdomen.  States she otherwise feels well, denying fever, URI symptoms, urinary symptoms. S/p hysterectomy      Abdominal Pain        Past Medical History  Allergies as of 07/11/2025 - Reviewed 07/11/2025   Allergen Reaction Noted    Erythromycin GI Upset and Nausea/vomiting 04/06/2013    Sulfa (sulfonamide antibiotics) Fever, Hives, and Rash 04/06/2013       Prescriptions Prior to Admission[1]     Medical History[2]    Surgical History[3]     reports that she has never smoked. She has never used smokeless tobacco.    Review of Systems  Pertinent systems reviewed and were negative unless otherwise stated in HPI.    Objective    Vitals:    07/11/25 0804   BP: 131/86   Pulse: 87   Resp: 18   Temp: 36.8 °C (98.3 °F)   SpO2: 97%   Weight: 61 kg (134 lb 6.4 oz)     No LMP recorded. (Menstrual status: Menopausal).    Physical Exam  Constitutional:       General: She is not in acute distress.  Cardiovascular:      Rate and Rhythm: Normal rate.   Pulmonary:      Effort: Pulmonary effort is normal.   Abdominal:      General: There is no distension.      Palpations: Abdomen is soft. There is no mass.      Tenderness: There is abdominal tenderness (mild LLQ). There is no guarding.   Skin:     Findings: No rash.         Diagnostic study results (if any) were reviewed by Jericho Linton PA-C.    Assessment/Plan   Allergies, medications, history, and pertinent  labs/EKGs/imaging reviewed by Jericho Linton PA-C.     Medical Decision Making  Patient is well-appearing, tolerating p.o. intake.  Abdomen is soft, minimally tender LLQ, nontender throughout otherwise. No concern for UTI in absence of urinary symptoms.  No hcg, s/p hysterectomy.  Symptoms are most likely related to residual symptoms from exposure to viral GI illness from her son.  No signs of dehydration.  Low concern for diverticulitis, appendicitis, cholecystitis, pancreatitis, or other acute abdominal process.  Advised PPI and antiemetic.  Patient expressed concern for colon cancer, so I referred to GI for further evaluation    Orders and Diagnoses  Diagnoses and all orders for this visit:  Left lower quadrant pain  -     Referral to Gastroenterology; Future  -     ondansetron (Zofran) 4 mg tablet; Take 1 tablet (4 mg) by mouth every 8 hours if needed for nausea or vomiting for up to 7 days.      Medical Admin Record      Disposition: Home    Electronically signed by Jericho Linton PA-C       [1] (Not in a hospital admission)   [2] No past medical history on file.  [3] No past surgical history on file.

## 2025-07-15 ENCOUNTER — APPOINTMENT (OUTPATIENT)
Dept: PRIMARY CARE | Facility: CLINIC | Age: 50
End: 2025-07-15
Payer: COMMERCIAL

## 2025-07-15 VITALS
BODY MASS INDEX: 20.29 KG/M2 | DIASTOLIC BLOOD PRESSURE: 84 MMHG | HEART RATE: 72 BPM | WEIGHT: 137 LBS | HEIGHT: 69 IN | SYSTOLIC BLOOD PRESSURE: 124 MMHG

## 2025-07-15 DIAGNOSIS — R10.32 LLQ PAIN: Primary | ICD-10-CM

## 2025-07-15 DIAGNOSIS — Z13.1 SCREENING FOR DIABETES MELLITUS: ICD-10-CM

## 2025-07-15 DIAGNOSIS — Z13.220 SCREENING FOR LIPID DISORDERS: ICD-10-CM

## 2025-07-15 DIAGNOSIS — Z13.29 SCREENING FOR THYROID DISORDER: ICD-10-CM

## 2025-07-15 DIAGNOSIS — Z00.00 ROUTINE GENERAL MEDICAL EXAMINATION AT A HEALTH CARE FACILITY: ICD-10-CM

## 2025-07-15 PROCEDURE — 99204 OFFICE O/P NEW MOD 45 MIN: CPT | Performed by: FAMILY MEDICINE

## 2025-07-15 PROCEDURE — 3008F BODY MASS INDEX DOCD: CPT | Performed by: FAMILY MEDICINE

## 2025-07-15 ASSESSMENT — ENCOUNTER SYMPTOMS
FLATUS: 1
ABDOMINAL PAIN: 1
OCCASIONAL FEELINGS OF UNSTEADINESS: 0
FEVER: 0
LOSS OF SENSATION IN FEET: 0
ARTHRALGIAS: 0
HEMATURIA: 0
HEMATOCHEZIA: 0
BELCHING: 0
DIARRHEA: 0
VOMITING: 0
WEIGHT LOSS: 0
FREQUENCY: 0
ANOREXIA: 1
DYSURIA: 0
CONSTIPATION: 0
MYALGIAS: 0
HEADACHES: 0
NAUSEA: 0
DEPRESSION: 0

## 2025-07-15 ASSESSMENT — PATIENT HEALTH QUESTIONNAIRE - PHQ9
SUM OF ALL RESPONSES TO PHQ9 QUESTIONS 1 AND 2: 0
1. LITTLE INTEREST OR PLEASURE IN DOING THINGS: NOT AT ALL
2. FEELING DOWN, DEPRESSED OR HOPELESS: NOT AT ALL

## 2025-07-16 ENCOUNTER — HOSPITAL ENCOUNTER (OUTPATIENT)
Dept: RADIOLOGY | Facility: CLINIC | Age: 50
Discharge: HOME | End: 2025-07-16
Payer: COMMERCIAL

## 2025-07-16 DIAGNOSIS — R10.32 LLQ PAIN: ICD-10-CM

## 2025-07-16 LAB
ALBUMIN SERPL-MCNC: 4.7 G/DL (ref 3.6–5.1)
ALP SERPL-CCNC: 68 U/L (ref 37–153)
ALT SERPL-CCNC: 10 U/L (ref 6–29)
ANION GAP SERPL CALCULATED.4IONS-SCNC: 7 MMOL/L (CALC) (ref 7–17)
AST SERPL-CCNC: 18 U/L (ref 10–35)
BASOPHILS # BLD AUTO: 40 CELLS/UL (ref 0–200)
BASOPHILS NFR BLD AUTO: 1.3 %
BILIRUB SERPL-MCNC: 0.4 MG/DL (ref 0.2–1.2)
BUN SERPL-MCNC: 14 MG/DL (ref 7–25)
CALCIUM SERPL-MCNC: 9.6 MG/DL (ref 8.6–10.4)
CHLORIDE SERPL-SCNC: 107 MMOL/L (ref 98–110)
CHOLEST SERPL-MCNC: 186 MG/DL
CHOLEST/HDLC SERPL: 2.8 (CALC)
CO2 SERPL-SCNC: 27 MMOL/L (ref 20–32)
CREAT SERPL-MCNC: 0.65 MG/DL (ref 0.5–1.03)
EGFRCR SERPLBLD CKD-EPI 2021: 107 ML/MIN/1.73M2
EOSINOPHIL # BLD AUTO: 68 CELLS/UL (ref 15–500)
EOSINOPHIL NFR BLD AUTO: 2.2 %
ERYTHROCYTE [DISTWIDTH] IN BLOOD BY AUTOMATED COUNT: 14.1 % (ref 11–15)
EST. AVERAGE GLUCOSE BLD GHB EST-MCNC: 100 MG/DL
EST. AVERAGE GLUCOSE BLD GHB EST-SCNC: 5.5 MMOL/L
GLUCOSE SERPL-MCNC: 98 MG/DL (ref 65–99)
HBA1C MFR BLD: 5.1 %
HCT VFR BLD AUTO: 43 % (ref 35–45)
HDLC SERPL-MCNC: 67 MG/DL
HGB BLD-MCNC: 14 G/DL (ref 11.7–15.5)
LDLC SERPL CALC-MCNC: 102 MG/DL (CALC)
LYMPHOCYTES # BLD AUTO: 1367 CELLS/UL (ref 850–3900)
LYMPHOCYTES NFR BLD AUTO: 44.1 %
MCH RBC QN AUTO: 28.9 PG (ref 27–33)
MCHC RBC AUTO-ENTMCNC: 32.6 G/DL (ref 32–36)
MCV RBC AUTO: 88.8 FL (ref 80–100)
MONOCYTES # BLD AUTO: 267 CELLS/UL (ref 200–950)
MONOCYTES NFR BLD AUTO: 8.6 %
NEUTROPHILS # BLD AUTO: 1358 CELLS/UL (ref 1500–7800)
NEUTROPHILS NFR BLD AUTO: 43.8 %
NONHDLC SERPL-MCNC: 119 MG/DL (CALC)
PLATELET # BLD AUTO: 164 THOUSAND/UL (ref 140–400)
PMV BLD REES-ECKER: 10.8 FL (ref 7.5–12.5)
POTASSIUM SERPL-SCNC: 5.1 MMOL/L (ref 3.5–5.3)
PROT SERPL-MCNC: 6.7 G/DL (ref 6.1–8.1)
RBC # BLD AUTO: 4.84 MILLION/UL (ref 3.8–5.1)
SODIUM SERPL-SCNC: 141 MMOL/L (ref 135–146)
T4 FREE SERPL-MCNC: 1.2 NG/DL (ref 0.8–1.8)
TRIGL SERPL-MCNC: 77 MG/DL
TSH SERPL-ACNC: 4.89 MIU/L
WBC # BLD AUTO: 3.1 THOUSAND/UL (ref 3.8–10.8)

## 2025-07-16 PROCEDURE — 74177 CT ABD & PELVIS W/CONTRAST: CPT | Performed by: RADIOLOGY

## 2025-07-16 PROCEDURE — 74177 CT ABD & PELVIS W/CONTRAST: CPT

## 2025-07-16 PROCEDURE — 2550000001 HC RX 255 CONTRASTS: Performed by: FAMILY MEDICINE

## 2025-07-16 RX ADMIN — IOHEXOL 68 ML: 350 INJECTION, SOLUTION INTRAVENOUS at 12:10

## 2025-07-17 ENCOUNTER — PATIENT MESSAGE (OUTPATIENT)
Dept: PRIMARY CARE | Facility: CLINIC | Age: 50
End: 2025-07-17
Payer: COMMERCIAL

## 2025-07-17 DIAGNOSIS — S39.012A STRAIN OF LUMBAR REGION, INITIAL ENCOUNTER: ICD-10-CM

## 2025-07-17 DIAGNOSIS — R10.32 LEFT LOWER QUADRANT PAIN: Primary | ICD-10-CM

## 2025-07-17 LAB
APPEARANCE UR: CLEAR
BACTERIA #/AREA URNS HPF: ABNORMAL /HPF
BACTERIA UR CULT: NORMAL
BILIRUB UR QL STRIP: NEGATIVE
COLOR UR: YELLOW
GLUCOSE UR QL STRIP: NEGATIVE
HGB UR QL STRIP: NEGATIVE
HYALINE CASTS #/AREA URNS LPF: ABNORMAL /LPF
KETONES UR QL STRIP: NEGATIVE
LEUKOCYTE ESTERASE UR QL STRIP: ABNORMAL
NITRITE UR QL STRIP: NEGATIVE
PH UR STRIP: 6.5 [PH] (ref 5–8)
PROT UR QL STRIP: NEGATIVE
RBC #/AREA URNS HPF: ABNORMAL /HPF
SERVICE CMNT-IMP: ABNORMAL
SP GR UR STRIP: 1.01 (ref 1–1.03)
SQUAMOUS #/AREA URNS HPF: ABNORMAL /HPF
WBC #/AREA URNS HPF: ABNORMAL /HPF

## 2025-07-17 RX ORDER — PREDNISONE 50 MG/1
50 TABLET ORAL DAILY
Qty: 7 TABLET | Refills: 0 | Status: SHIPPED | OUTPATIENT
Start: 2025-07-17 | End: 2025-07-24

## 2025-07-20 PROBLEM — Z00.00 ROUTINE GENERAL MEDICAL EXAMINATION AT A HEALTH CARE FACILITY: Status: ACTIVE | Noted: 2025-07-20

## 2025-07-20 PROBLEM — Z13.220 SCREENING FOR LIPID DISORDERS: Status: ACTIVE | Noted: 2025-07-20

## 2025-07-20 PROBLEM — Z13.29 SCREENING FOR THYROID DISORDER: Status: ACTIVE | Noted: 2025-07-20

## 2025-07-20 PROBLEM — Z13.1 SCREENING FOR DIABETES MELLITUS: Status: ACTIVE | Noted: 2025-07-20

## 2025-07-20 PROBLEM — R10.32 LLQ PAIN: Status: ACTIVE | Noted: 2025-07-20

## 2025-07-20 ASSESSMENT — ENCOUNTER SYMPTOMS
ABDOMINAL PAIN: 1
HEADACHES: 0
FLATUS: 1
HEMATURIA: 0
FEVER: 0
MYALGIAS: 0
BELCHING: 0
HEMATOCHEZIA: 0
DIARRHEA: 0
NAUSEA: 0
DYSURIA: 0
FREQUENCY: 0
ANOREXIA: 1
BACK PAIN: 1
VOMITING: 0
CONSTIPATION: 0
WEIGHT LOSS: 0
ARTHRALGIAS: 0

## 2025-07-20 NOTE — PROGRESS NOTES
"Subjective   Patient ID: Stephie Cardona is a 50 y.o. female who presents for New Patient Visit, Back Pain, and Abdominal Pain.  New patient here to [facility]   Has had left lower quad pain Severe .pain .  No solo in BM.     Back Pain  Associated symptoms include abdominal pain. Pertinent negatives include no dysuria, fever, headaches or weight loss.   Abdominal Pain  This is a new problem. The current episode started 1 to 4 weeks ago. The onset quality is gradual. The problem occurs constantly. The problem has been gradually worsening. The pain is located in the LLQ. The pain is at a severity of 6/10. The quality of the pain is aching. The abdominal pain radiates to the back and left flank. Associated symptoms include anorexia and flatus. Pertinent negatives include no arthralgias, belching, constipation, diarrhea, dysuria, fever, frequency, headaches, hematochezia, hematuria, melena, myalgias, nausea, vomiting or weight loss. The pain is aggravated by certain positions. The pain is relieved by Nothing.   Has   Medications Ordered Prior to Encounter[1]     Review of Systems   Constitutional:  Negative for fever and weight loss.   Gastrointestinal:  Positive for abdominal pain, anorexia and flatus. Negative for constipation, diarrhea, hematochezia, melena, nausea and vomiting.   Genitourinary:  Negative for dysuria, frequency and hematuria.   Musculoskeletal:  Positive for back pain. Negative for arthralgias and myalgias.   Neurological:  Negative for headaches.       Objective   /84   Pulse 72   Ht 1.753 m (5' 9\")   Wt 62.1 kg (137 lb)   BMI 20.23 kg/m²   BSA: 1.74 meters squared  Growth percentiles: Facility age limit for growth %joaquín is 20 years. Facility age limit for growth %joaquín is 20 years.   Office Visit on 07/15/2025   Component Date Value Ref Range Status    CHOLESTEROL, TOTAL 07/15/2025 186  <200 mg/dL Final    HDL CHOLESTEROL 07/15/2025 67  > OR = 50 mg/dL Final    TRIGLYCERIDES 07/15/2025 77  " <150 mg/dL Final    LDL-CHOLESTEROL 07/15/2025 102 (H)  mg/dL (calc) Final    Comment: Reference range: <100     Desirable range <100 mg/dL for primary prevention;    <70 mg/dL for patients with CHD or diabetic patients   with > or = 2 CHD risk factors.     LDL-C is now calculated using the Arash-Rosado   calculation, which is a validated novel method providing   better accuracy than the Friedewald equation in the   estimation of LDL-C.   Arash SS et al. JONNIE. 2013;310(19): 8873-3870   (http://education.Clicko/faq/LFJ339)      CHOL/HDLC RATIO 07/15/2025 2.8  <5.0 (calc) Final    NON HDL CHOLESTEROL 07/15/2025 119  <130 mg/dL (calc) Final    Comment: For patients with diabetes plus 1 major ASCVD risk   factor, treating to a non-HDL-C goal of <100 mg/dL   (LDL-C of <70 mg/dL) is considered a therapeutic   option.      TSH W/REFLEX TO FT4 07/15/2025 4.89 (H)  mIU/L Final    Comment:           Reference Range                         > or = 20 Years  0.40-4.50                              Pregnancy Ranges            First trimester    0.26-2.66            Second trimester   0.55-2.73            Third trimester    0.43-2.91      T4, FREE 07/15/2025 1.2  0.8 - 1.8 ng/dL Final    HEMOGLOBIN A1c 07/15/2025 5.1  <5.7 % Final    Comment: For the purpose of screening for the presence of  diabetes:     <5.7%       Consistent with the absence of diabetes  5.7-6.4%    Consistent with increased risk for diabetes              (prediabetes)  > or =6.5%  Consistent with diabetes     This assay result is consistent with a decreased risk  of diabetes.     Currently, no consensus exists regarding use of  hemoglobin A1c for diagnosis of diabetes in children.     According to American Diabetes Association (ADA)  guidelines, hemoglobin A1c <7.0% represents optimal  control in non-pregnant diabetic patients. Different  metrics may apply to specific patient populations.   Standards of Medical Care in Diabetes(ADA).           eAG (mg/dL) 07/15/2025 100  mg/dL Final    eAG (mmol/L) 07/15/2025 5.5  mmol/L Final    GLUCOSE 07/15/2025 98  65 - 99 mg/dL Final    Comment:               Fasting reference interval         UREA NITROGEN (BUN) 07/15/2025 14  7 - 25 mg/dL Final    CREATININE 07/15/2025 0.65  0.50 - 1.03 mg/dL Final    EGFR 07/15/2025 107  > OR = 60 mL/min/1.73m2 Final    SODIUM 07/15/2025 141  135 - 146 mmol/L Final    POTASSIUM 07/15/2025 5.1  3.5 - 5.3 mmol/L Final    CHLORIDE 07/15/2025 107  98 - 110 mmol/L Final    CARBON DIOXIDE 07/15/2025 27  20 - 32 mmol/L Final    ELECTROLYTE BALANCE 07/15/2025 7  7 - 17 mmol/L (calc) Final    CALCIUM 07/15/2025 9.6  8.6 - 10.4 mg/dL Final    PROTEIN, TOTAL 07/15/2025 6.7  6.1 - 8.1 g/dL Final    ALBUMIN 07/15/2025 4.7  3.6 - 5.1 g/dL Final    BILIRUBIN, TOTAL 07/15/2025 0.4  0.2 - 1.2 mg/dL Final    ALKALINE PHOSPHATASE 07/15/2025 68  37 - 153 U/L Final    AST 07/15/2025 18  10 - 35 U/L Final    ALT 07/15/2025 10  6 - 29 U/L Final    WHITE BLOOD CELL COUNT 07/15/2025 3.1 (L)  3.8 - 10.8 Thousand/uL Final    RED BLOOD CELL COUNT 07/15/2025 4.84  3.80 - 5.10 Million/uL Final    HEMOGLOBIN 07/15/2025 14.0  11.7 - 15.5 g/dL Final    HEMATOCRIT 07/15/2025 43.0  35.0 - 45.0 % Final    MCV 07/15/2025 88.8  80.0 - 100.0 fL Final    MCH 07/15/2025 28.9  27.0 - 33.0 pg Final    MCHC 07/15/2025 32.6  32.0 - 36.0 g/dL Final    Comment: For adults, a slight decrease in the calculated MCHC  value (in the range of 30 to 32 g/dL) is most likely  not clinically significant; however, it should be  interpreted with caution in correlation with other  red cell parameters and the patient's clinical  condition.      RDW 07/15/2025 14.1  11.0 - 15.0 % Final    PLATELET COUNT 07/15/2025 164  140 - 400 Thousand/uL Final    MPV 07/15/2025 10.8  7.5 - 12.5 fL Final    ABSOLUTE NEUTROPHILS 07/15/2025 1,358 (L)  1,500 - 7,800 cells/uL Final    ABSOLUTE LYMPHOCYTES 07/15/2025 1,367  850 - 3,900 cells/uL Final     ABSOLUTE MONOCYTES 07/15/2025 267  200 - 950 cells/uL Final    ABSOLUTE EOSINOPHILS 07/15/2025 68  15 - 500 cells/uL Final    ABSOLUTE BASOPHILS 07/15/2025 40  0 - 200 cells/uL Final    NEUTROPHILS 07/15/2025 43.8  % Final    LYMPHOCYTES 07/15/2025 44.1  % Final    MONOCYTES 07/15/2025 8.6  % Final    EOSINOPHILS 07/15/2025 2.2  % Final    BASOPHILS 07/15/2025 1.3  % Final    COLOR 07/15/2025 YELLOW  YELLOW Final    APPEARANCE 07/15/2025 CLEAR  CLEAR Final    SPECIFIC GRAVITY 07/15/2025 1.006  1.001 - 1.035 Final    PH 07/15/2025 6.5  5.0 - 8.0 Final    GLUCOSE 07/15/2025 NEGATIVE  NEGATIVE Final    BILIRUBIN 07/15/2025 NEGATIVE  NEGATIVE Final    KETONES 07/15/2025 NEGATIVE  NEGATIVE Final    OCCULT BLOOD 07/15/2025 NEGATIVE  NEGATIVE Final    PROTEIN 07/15/2025 NEGATIVE  NEGATIVE Final    NITRITE 07/15/2025 NEGATIVE  NEGATIVE Final    LEUKOCYTE ESTERASE 07/15/2025 TRACE (A)  NEGATIVE Final    WBC 07/15/2025 NONE SEEN  < OR = 5 /HPF Final    RBC 07/15/2025 NONE SEEN  < OR = 2 /HPF Final    SQUAMOUS EPITHELIAL CELLS 07/15/2025 NONE SEEN  < OR = 5 /HPF Final    BACTERIA 07/15/2025 NONE SEEN  NONE SEEN /HPF Final    HYALINE CAST 07/15/2025 NONE SEEN  NONE SEEN /LPF Final    NOTE 07/15/2025    Final    Comment: This urine was analyzed for the presence of WBC,   RBC, bacteria, casts, and other formed elements.   Only those elements seen were reported.            CULTURE, URINE, ROUTINE 07/15/2025 SEE NOTE   Final    Comment:     CULTURE, URINE, ROUTINE         Micro Number:      [id number]    Test Status:       Final    Specimen Source:   Urine    Specimen Quality:  Adequate    Result:            Less than 10,000 CFU/mL of single Gram positive                       organism isolated. No further testing will be                       performed. If clinically indicated, recollection                       using a method to minimize contamination, with                       prompt transfer to Urine Culture Transport Tube,                        is recommended.        Physical Exam  Constitutional:       Appearance: Normal appearance.     Cardiovascular:      Rate and Rhythm: Normal rate and regular rhythm.   Pulmonary:      Effort: Pulmonary effort is normal.      Breath sounds: Normal breath sounds.   Abdominal:      General: Bowel sounds are normal.      Tenderness: There is abdominal tenderness.     Musculoskeletal:         General: Tenderness present.      Comments: Tenderness left lower quad. Tender LS spine      Neurological:      Mental Status: She is alert.     Psychiatric:         Mood and Affect: Mood normal.         Assessment/Plan   Problem List Items Addressed This Visit       Routine general medical examination at a health care facility - Primary    Relevant Orders    Lipid Panel (Completed)    TSH with reflex to Free T4 if abnormal (Completed)    Hemoglobin A1C (Completed)    Comprehensive Metabolic Panel (Completed)    CBC and Auto Differential (Completed)    LLQ pain    Relevant Orders    Lipid Panel (Completed)    TSH with reflex to Free T4 if abnormal (Completed)    Hemoglobin A1C (Completed)    Comprehensive Metabolic Panel (Completed)    CBC and Auto Differential (Completed)    CT abdomen pelvis w IV contrast (Completed)    Urinalysis with Reflex Microscopic (Completed)    Urine Culture (Completed)    Screening for diabetes mellitus    Relevant Orders    Lipid Panel (Completed)    TSH with reflex to Free T4 if abnormal (Completed)    Hemoglobin A1C (Completed)    Comprehensive Metabolic Panel (Completed)    CBC and Auto Differential (Completed)    Screening for thyroid disorder    Relevant Orders    Lipid Panel (Completed)    TSH with reflex to Free T4 if abnormal (Completed)    Hemoglobin A1C (Completed)    Comprehensive Metabolic Panel (Completed)    CBC and Auto Differential (Completed)    Screening for lipid disorders    Relevant Orders    Lipid Panel (Completed)    TSH with reflex to Free T4 if abnormal  (Completed)    Hemoglobin A1C (Completed)    Comprehensive Metabolic Panel (Completed)    CBC and Auto Differential (Completed)                   [1]   Current Outpatient Medications on File Prior to Visit   Medication Sig Dispense Refill    [] ondansetron (Zofran) 4 mg tablet Take 1 tablet (4 mg) by mouth every 8 hours if needed for nausea or vomiting for up to 7 days. 10 tablet 0     No current facility-administered medications on file prior to visit.

## 2025-07-23 NOTE — PROGRESS NOTES
Department of Gastroenterology & Hepatology  Initial Consult Note  Date of Service:  July 26, 2025         Patient: Stephie Cardona    Medical Record: [id number]  Reason for Admission / Consultation: llq pain, nausea  Gastroenterology Attending: Baljeet Bazan MD  Referring Provider: MD Jericho Brambila PA-C  [address] [id number]         My final recommendations will be communicated back to the requesting physician by way of shared medical record or letter via [place] mail         Impression: 50-year-old pleasant female with no significant past medical history here for 1 month history of nausea and left lower quadrant abdominal pain    She does have acid reflux symptoms along with nausea which are better with omeprazole 20 mg daily  This time I will continue omeprazole 20 mg and schedule her for EGD with biopsy    Left lower quadrant pain  No acute findings on CT scan except small amount of nonspecific pelvic fluid.  No improvement with prednisone.   She does not have any tenderness or rebound tenderness on my exam  Not entirely sure if her pain is from GI etiology especially with free pelvic fluid she could have some OB/GYN issues  I will schedule her for a colonoscopy, she can take as needed Levsin  She is already scheduled to see OB/GYN  If no findings on colonoscopy and no improvement in pain I would recommend to discuss with PCP to get pelvic ultrasound     Follow-up in 4 to 6 weeks    Baljeet Bazan MD  Gastroenterology, Hepatology and Nutrition  Advanced Endoscopy  =========================================================================  History of Present Illness:     Stephie Cardona  is a 50 y.o.   has a past medical history of Varicella (1984). who presents for one month history of history of nausea and left lower quadrant pain.  She states that her son was sick about a month ago and then she started having nausea and left lower quadrant and left flank pain.  She describes the pain as  a 4-8 out of 10, off-and-on in severity but consistent 3-4 discomfort.  She denies pain in other areas of her abdomen denies any vomiting.  She had acid reflux and was started on omeprazole 20 mg about 2 weeks ago which has made her nausea and reflux symptoms better.  Because of her lower back pain she was treated with prednisone for 6 days without any improvement.  She is having 1-2 bowel movements per day which are [place] 3-4.  Her stool was slightly softer when she started having symptoms but they are back to normal.  Denies any blood in stool or weight loss.  No nighttime symptoms.       Ibuproefn every few weeks  No smoking, drinks 1-2 drinks per week   No THC    No prior EGD or colonoscopy  No family history of colon cancer    CT abdominal pelvis 7/16/2025  FINDINGS:  Lower Chest: Right middle lobe 7 mm pulmonary nodule. Scattered  streaky atelectasis.      Liver: Subcentimeter hypodensity in the right lobe of the liver, too  small to characterize.      Gallbladder and Biliary: Unremarkable.      Pancreas: No abnormality identified in the pancreas.      Spleen: Calcified granuloma.      Adrenals: No abnormality identified in either adrenal gland.      Urinary: Couple of subcentimeter left renal hypodensities, too small  to characterize. No hydronephrosis.      Reproductive: Prominent periuterine veins bilaterally which may be  seen with pelvic congestion syndrome in the appropriate clinical  setting.      Gastrointestinal/Peritoneum: No small or large bowel obstruction in  the visualized abdomen. In the abdomen, there is no extraluminal air.  Small amount of nonspecific pelvic free fluid. No evidence of acute  appendicitis.      Vascular: Abdominal aorta is normal in caliber. Mild atherosclerosis.      Lymphatics: No enlarged lymph nodes by size criteria.      MSK/Body Wall: No aggressive bony lesion identified. Small fat  containing umbilical hernia.        Pertinent Review of Systems:    Per HPI rest 12  "point ROS negative      Past Medical History:    Medical History[1]     Past Surgical History:  Surgical History[2]     Family History:  Family History[3]     Social History:  Social History     Tobacco Use    Smoking status: Never    Smokeless tobacco: Never   Substance Use Topics    Alcohol use: Yes     Alcohol/week: 2.0 standard drinks of alcohol     Types: 1 Glasses of wine, 1 Standard drinks or equivalent per week     Comment: 1 cocktail on Wednesday nights        Allergies:  RX Allergies[4]      Medications:  Medications Ordered Prior to Encounter[5]         Physical Exam:  /79 (BP Location: Left arm, Patient Position: Sitting)   Pulse 75   Ht 1.727 m (5' 8\")   Wt 62.7 kg (138 lb 3.2 oz)   BMI 21.01 kg/m²    General appearance: well appearing, alert, in no acute distress  Skin:  no rashes or lesions  Head: normal  Eyes: Anicteric sclera.   ENT: No oral erythema  Lungs: lungs clear to auscultation, no wheezing or rhonchi  Heart: RRR without murmur  Abdomen:  Abdomen soft, non-tender. Bowel sounds normal.   Extremities: Extremities normal.   Musculoskeletal: Muscular strength grossly intact  Neuro: CN2-12 grossly intact     Labs:  Current Medications[6]         SIGNATURE: Baljeet Bazan MD PATIENT NAME: Stephie Cardona   DATE: 2025 MRN: [id number]   TIME: 11:48 AM           [1]   Past Medical History:  Diagnosis Date    Varicella    [2]   Past Surgical History:  Procedure Laterality Date    BREAST SURGERY     [3]   Family History  Problem Relation Name Age of Onset    Depression Father Ernie Soriano     Breast cancer Paternal Grandmother Sruthi Hawa 70 - 79   [4]   Allergies  Allergen Reactions    Erythromycin GI Upset and Nausea/vomiting     rash    nausea    Sulfa (Sulfonamide Antibiotics) Fever, Hives and Rash     Body ache, and flu like symptoms per pt   [5]   Current Outpatient Medications on File Prior to Visit   Medication Sig Dispense Refill    [] predniSONE (Deltasone) 50 " mg tablet Take 1 tablet (50 mg) by mouth once daily for 7 days. 7 tablet 0     No current facility-administered medications on file prior to visit.   [6]   Current Outpatient Medications:     hyoscyamine (Anaspaz, Levsin) 0.125 mg tablet, Take 1 tablet (0.125 mg) by mouth every 6 hours if needed for cramping or diarrhea., Disp: 100 tablet, Rfl: 11    omeprazole (PriLOSEC) 20 mg DR capsule, Take 1 capsule (20 mg) by mouth once daily in the morning. Take before meals. Do not crush or chew., Disp: 30 capsule, Rfl: 2

## 2025-07-24 ENCOUNTER — APPOINTMENT (OUTPATIENT)
Dept: GASTROENTEROLOGY | Facility: CLINIC | Age: 50
End: 2025-07-24
Payer: COMMERCIAL

## 2025-07-24 VITALS
HEIGHT: 68 IN | SYSTOLIC BLOOD PRESSURE: 123 MMHG | WEIGHT: 138.2 LBS | HEART RATE: 75 BPM | DIASTOLIC BLOOD PRESSURE: 79 MMHG | BODY MASS INDEX: 20.95 KG/M2

## 2025-07-24 DIAGNOSIS — K21.9 GASTROESOPHAGEAL REFLUX DISEASE, UNSPECIFIED WHETHER ESOPHAGITIS PRESENT: ICD-10-CM

## 2025-07-24 DIAGNOSIS — R11.0 NAUSEA: ICD-10-CM

## 2025-07-24 DIAGNOSIS — R10.32 LLQ PAIN: Primary | ICD-10-CM

## 2025-07-24 PROCEDURE — 3008F BODY MASS INDEX DOCD: CPT | Performed by: INTERNAL MEDICINE

## 2025-07-24 PROCEDURE — 99204 OFFICE O/P NEW MOD 45 MIN: CPT | Performed by: INTERNAL MEDICINE

## 2025-07-24 RX ORDER — OMEPRAZOLE 20 MG/1
20 CAPSULE, DELAYED RELEASE ORAL
Qty: 30 CAPSULE | Refills: 2 | Status: SHIPPED | OUTPATIENT
Start: 2025-07-24

## 2025-07-24 RX ORDER — HYOSCYAMINE SULFATE 0.125 MG
0.12 TABLET ORAL EVERY 6 HOURS PRN
Qty: 100 TABLET | Refills: 11 | Status: SHIPPED | OUTPATIENT
Start: 2025-07-24 | End: 2026-07-24

## 2025-07-24 NOTE — PATIENT INSTRUCTIONS
Take levsin as needed every 6 hours for abdominal pain/spasms  EGD and coloscopy  Continue omeprazole 20 mg daily

## 2025-07-30 DIAGNOSIS — R10.32 LLQ PAIN: Primary | ICD-10-CM

## 2025-08-18 ENCOUNTER — APPOINTMENT (OUTPATIENT)
Dept: PRIMARY CARE | Facility: CLINIC | Age: 50
End: 2025-08-18
Payer: COMMERCIAL

## 2025-09-11 ENCOUNTER — APPOINTMENT (OUTPATIENT)
Dept: OBSTETRICS AND GYNECOLOGY | Facility: CLINIC | Age: 50
End: 2025-09-11
Payer: COMMERCIAL

## 2025-09-12 ENCOUNTER — APPOINTMENT (OUTPATIENT)
Dept: GASTROENTEROLOGY | Facility: EXTERNAL LOCATION | Age: 50
End: 2025-09-12
Payer: COMMERCIAL

## 2025-09-25 ENCOUNTER — APPOINTMENT (OUTPATIENT)
Dept: GASTROENTEROLOGY | Facility: CLINIC | Age: 50
End: 2025-09-25
Payer: COMMERCIAL

## 2025-09-26 ENCOUNTER — APPOINTMENT (OUTPATIENT)
Dept: PRIMARY CARE | Facility: CLINIC | Age: 50
End: 2025-09-26
Payer: COMMERCIAL

## 2025-10-17 ENCOUNTER — APPOINTMENT (OUTPATIENT)
Dept: GASTROENTEROLOGY | Facility: EXTERNAL LOCATION | Age: 50
End: 2025-10-17
Payer: COMMERCIAL

## (undated) DEVICE — GLOVE PROTEXIS W/NEU-THERA 6.5  2D73TE65

## (undated) DEVICE — DECANTER VIAL 2006S

## (undated) DEVICE — CATH INTERMITTENT CLEAN-CATH FEMALE 14FR 6" VINYL LF 420614

## (undated) DEVICE — DRSG TELFA 3X8" 1238

## (undated) DEVICE — TUBING CYSTO/BLADDER IRRIG SET 80" 06544-01

## (undated) DEVICE — SOL NACL 0.9% IRRIG 1000ML BOTTLE 07138-09

## (undated) DEVICE — SOL NACL 0.9% INJ 1000ML BAG 07983-09

## (undated) DEVICE — SOL WATER IRRIG 1000ML BOTTLE 07139-09

## (undated) DEVICE — NDL SPINAL 22GA 3.5" QUINCKE 405181

## (undated) DEVICE — PAD PERI INDIV WRAP 11" 2022

## (undated) DEVICE — LUBRICATING JELLY 4.25OZ

## (undated) DEVICE — TUBING SUCTION 12"X1/4" N612

## (undated) DEVICE — PACK LAPAROSCOPY/PELVISCOPY STD

## (undated) DEVICE — DEVICE ENDOMETRIAL ABLATION SYS MINERVA HANDPIECE MIN9770

## (undated) DEVICE — GOWN LG DISP 9515

## (undated) RX ORDER — DEXAMETHASONE SODIUM PHOSPHATE 4 MG/ML
INJECTION, SOLUTION INTRA-ARTICULAR; INTRALESIONAL; INTRAMUSCULAR; INTRAVENOUS; SOFT TISSUE
Status: DISPENSED
Start: 2020-12-07

## (undated) RX ORDER — BUPIVACAINE HYDROCHLORIDE 2.5 MG/ML
INJECTION, SOLUTION INFILTRATION; PERINEURAL
Status: DISPENSED
Start: 2020-12-07

## (undated) RX ORDER — LIDOCAINE HYDROCHLORIDE 10 MG/ML
INJECTION, SOLUTION EPIDURAL; INFILTRATION; INTRACAUDAL; PERINEURAL
Status: DISPENSED
Start: 2020-12-07

## (undated) RX ORDER — KETOROLAC TROMETHAMINE 30 MG/ML
INJECTION, SOLUTION INTRAMUSCULAR; INTRAVENOUS
Status: DISPENSED
Start: 2020-12-07

## (undated) RX ORDER — GLYCOPYRROLATE 0.2 MG/ML
INJECTION, SOLUTION INTRAMUSCULAR; INTRAVENOUS
Status: DISPENSED
Start: 2020-12-07

## (undated) RX ORDER — ACETAMINOPHEN 325 MG/1
TABLET ORAL
Status: DISPENSED
Start: 2020-12-07

## (undated) RX ORDER — LIDOCAINE HYDROCHLORIDE 20 MG/ML
JELLY TOPICAL
Status: DISPENSED
Start: 2020-12-07

## (undated) RX ORDER — ONDANSETRON 2 MG/ML
INJECTION INTRAMUSCULAR; INTRAVENOUS
Status: DISPENSED
Start: 2020-12-07

## (undated) RX ORDER — PROPOFOL 10 MG/ML
INJECTION, EMULSION INTRAVENOUS
Status: DISPENSED
Start: 2020-12-07

## (undated) RX ORDER — FENTANYL CITRATE 50 UG/ML
INJECTION, SOLUTION INTRAMUSCULAR; INTRAVENOUS
Status: DISPENSED
Start: 2020-12-07

## (undated) RX ORDER — GABAPENTIN 300 MG/1
CAPSULE ORAL
Status: DISPENSED
Start: 2020-12-07